# Patient Record
Sex: FEMALE | Race: WHITE | NOT HISPANIC OR LATINO | Employment: FULL TIME | URBAN - NONMETROPOLITAN AREA
[De-identification: names, ages, dates, MRNs, and addresses within clinical notes are randomized per-mention and may not be internally consistent; named-entity substitution may affect disease eponyms.]

---

## 2019-04-15 ENCOUNTER — PROCEDURE VISIT (OUTPATIENT)
Dept: OBSTETRICS AND GYNECOLOGY | Facility: CLINIC | Age: 24
End: 2019-04-15

## 2019-04-15 VITALS
DIASTOLIC BLOOD PRESSURE: 60 MMHG | HEIGHT: 63 IN | WEIGHT: 137 LBS | BODY MASS INDEX: 24.27 KG/M2 | SYSTOLIC BLOOD PRESSURE: 118 MMHG

## 2019-04-15 DIAGNOSIS — Z01.419 ENCOUNTER FOR GYNECOLOGICAL EXAMINATION (GENERAL) (ROUTINE) WITHOUT ABNORMAL FINDINGS: Primary | ICD-10-CM

## 2019-04-15 PROCEDURE — 99385 PREV VISIT NEW AGE 18-39: CPT | Performed by: OBSTETRICS & GYNECOLOGY

## 2019-04-15 RX ORDER — LEVOTHYROXINE SODIUM 0.12 MG/1
TABLET ORAL
COMMUNITY
End: 2020-09-25 | Stop reason: SDUPTHER

## 2019-04-15 NOTE — PROGRESS NOTES
Subjective  Chief Complaint   Patient presents with   • Gynecologic Exam     Patient needs refill on Sprintec.      Patient is 23 y.o.  here as a new patient for her annual examination.  Patient is new to this area.  Patient sees Stephanie Valladares nurse practitioner at [facility] for her primary care.  Patient gives a history of having previous HPV.  Patient is uncertain how this was detected.  Patient reports she had no further evaluation.  Patient denies any colposcopy or biopsy.  The patient denies any visible lesions.  The patient reports her last Pap smear was 1 year ago.  The patient is currently on oral contraceptives.  The patient declines any STI testing.  The patient denies any vaginal itching, discharge, or odor.  The patient reports her menses are regular and light in nature.  The patient desires to continue her current oral contraceptive.    History  Past Medical History:   Diagnosis Date   • Abnormal Pap smear of cervix 2018   • Celiac disease    • Disease of thyroid gland    • HPV (human papilloma virus) infection    • Migraine    • Ovarian cyst    • PMS (premenstrual syndrome)      Current Outpatient Medications on File Prior to Visit   Medication Sig Dispense Refill   • levothyroxine (SYNTHROID, LEVOTHROID) 125 MCG tablet levothyroxine 125 mcg tablet   Take 1 tablet every day by oral route for 90 days.     • [DISCONTINUED] SPRINTEC 28 0.25-35 MG-MCG per tablet Take 1 tablet by mouth Daily.      No current facility-administered medications on file prior to visit.      Allergies   Allergen Reactions   • Cephalosporins Rash   • Penicillins Rash   • Sulfa Antibiotics Rash     Past Surgical History:   Procedure Laterality Date   • BUNIONECTOMY Bilateral      Family History   Problem Relation Age of Onset   • No Known Problems Father    • No Known Problems Mother    • No Known Problems Brother    • No Known Problems Sister    • No Known Problems Son    • No Known Problems Daughter   "  • No Known Problems Paternal Grandfather    • No Known Problems Paternal Grandmother    • No Known Problems Maternal Grandmother    • No Known Problems Maternal Grandfather    • No Known Problems Maternal Aunt    • No Known Problems Maternal Uncle    • No Known Problems Paternal Aunt    • No Known Problems Paternal Uncle      Social History     Socioeconomic History   • Marital status: Single     Spouse name: Not on file   • Number of children: Not on file   • Years of education: Not on file   • Highest education level: Not on file   Tobacco Use   • Smoking status: Never Smoker   • Smokeless tobacco: Never Used   Substance and Sexual Activity   • Alcohol use: Yes     Frequency: Never     Comment: 1-2   • Drug use: No   • Sexual activity: Yes     Partners: Male     Birth control/protection: Condom, OCP     Review of Systems  The following systems were reviewed and negative:  constitution, eyes, ENT, respiratory, cardiovascular, gastrointestinal, genitourinary, integument, breast, hematologic / lymphatic, musculoskeletal, neurological, behavioral/psych, endocrine and allergies / immunologic     Objective  Vitals:    04/15/19 0928   BP: 118/60   Weight: 62.1 kg (137 lb)   Height: 160 cm (63\")     Physical Exam:  General Appearance: alert, appears stated age and cooperative  Head: normocephalic, without obvious abnormality and atraumatic  Eyes: lids and lashes normal, conjunctivae and sclerae normal, no icterus, no pallor, corneas clear and PERRLA  Ears: ears appear intact with no abnormalities noted  Nose: nares normal, septum midline, mucosa normal and no drainage  Neck: suppple, trachea midline and no thyromegaly  Lungs: clear to auscultation, respirations regular, respirations even and respirations unlabored  Heart: regular rhythm and normal rate, normal S1, S2, no murmur, gallop, or rubs and no click  Breasts: Examined in supine position  Symmetric without masses or skin dimpling  Nipples normal without " inversion, lesions or discharge  There are no palpable axillary nodes  Abdomen: normal bowel sounds, no masses, no hepatomegaly, no splenomegaly, soft non-tender, no guarding and no rebound tenderness  Pelvic: Clinical staff was present for exam  External genitalia:  normal appearance of the external genitalia including Bartholin's and [place]'s glands.  :  urethral meatus normal;  Vaginal:  normal pink mucosa without prolapse or lesions.  Cervix:  normal appearance.  Uterus:  normal size, shape and consistency.  Adnexa:  normal bimanual exam of the adnexa.  Pap smear done and specimen sent using Thin-Prep technique  Extremities: moves extremities well, no edema, no cyanosis and no redness  Skin: no bleeding, bruising or rash and no lesions noted  Lymph Nodes: no palpable adenopathy  Neuro: CN II-X grossly intact; sensation intact  Psych: normal mood and affect, oriented to person, time and place, thought content organized and appropriate judgment  Lab Review   No data reviewed    Imaging   No data reviewed    Assessment/Plan  Problem List Items Addressed This Visit     None      Visit Diagnoses     Encounter for gynecological examination (general) (routine) without abnormal findings    -  Primary  Pap was done today.  If she does not receive the results of the Pap within 2 weeks  time, she was instructed to call to find out the results.  HPV testing also performed given patient's history and records not available.  Plan pending results.  Rx given for ocps as noted.  Instructions and precautions given.    Relevant Orders    Pap IG, HPV-hr        Follow up as discussed/scheduled  This note was electronically signed.  Ashly Adames M.D.

## 2019-04-24 DIAGNOSIS — Z01.419 ENCOUNTER FOR GYNECOLOGICAL EXAMINATION (GENERAL) (ROUTINE) WITHOUT ABNORMAL FINDINGS: ICD-10-CM

## 2020-06-16 ENCOUNTER — OFFICE VISIT (OUTPATIENT)
Dept: OBSTETRICS AND GYNECOLOGY | Facility: CLINIC | Age: 25
End: 2020-06-16

## 2020-06-16 VITALS
BODY MASS INDEX: 22.99 KG/M2 | HEIGHT: 65 IN | WEIGHT: 138 LBS | DIASTOLIC BLOOD PRESSURE: 64 MMHG | SYSTOLIC BLOOD PRESSURE: 112 MMHG

## 2020-06-16 DIAGNOSIS — Z30.8 ENCOUNTER FOR OTHER CONTRACEPTIVE MANAGEMENT: ICD-10-CM

## 2020-06-16 DIAGNOSIS — Z01.419 ENCOUNTER FOR GYNECOLOGICAL EXAMINATION (GENERAL) (ROUTINE) WITHOUT ABNORMAL FINDINGS: Primary | ICD-10-CM

## 2020-06-16 PROCEDURE — 99395 PREV VISIT EST AGE 18-39: CPT | Performed by: OBSTETRICS & GYNECOLOGY

## 2020-06-16 NOTE — PROGRESS NOTES
Subjective  Chief Complaint   Patient presents with   • Gynecologic Exam     Patient wants to discuss Kyleena IUD.      Patient is 24 y.o.  here for her annual examination.  Patient is currently on Sprintec.  Patient reports her menstrual cycles are regular.  Patient would like to consider Kyleena.  Patient will be moving same her fiancé is in the .  Patient is concerned regarding her oral contraceptives as she may be moving frequently.  Patient is not contemplating a pregnancy in the near future.  Patient has had previous abnormal Pap smear.  Her last Pap smear however was normal.  The patient's last Pap smear was in April of last year.    History  Past Medical History:   Diagnosis Date   • Abnormal Pap smear of cervix 2018   • Celiac disease    • Disease of thyroid gland    • HPV (human papilloma virus) infection    • Migraine    • Ovarian cyst    • PMS (premenstrual syndrome)      Current Outpatient Medications on File Prior to Visit   Medication Sig Dispense Refill   • albuterol sulfate  (90 Base) MCG/ACT inhaler Inhale 2 puffs Every 4 (Four) Hours As Needed for Wheezing. 1 inhaler 0   • fluticasone (FLONASE) 50 MCG/ACT nasal spray 1-2 sprays each nostril daily 1 bottle 0   • Fluticasone Furoate-Vilanterol (Breo Ellipta) 200-25 MCG/INH inhaler Inhale 1 puff Daily. 1 inhaler 0   • levothyroxine (SYNTHROID, LEVOTHROID) 125 MCG tablet levothyroxine 125 mcg tablet   Take 1 tablet every day by oral route for 90 days.     • loratadine (CLARITIN) 10 MG tablet Take 1 tablet by mouth Daily. 30 tablet 0   • methylPREDNISolone (MEDROL, STONEY,) 4 MG tablet Take as directed on package instructions. 21 tablet 0     No current facility-administered medications on file prior to visit.      Allergies   Allergen Reactions   • Cephalosporins Rash   • Penicillins Rash   • Sulfa Antibiotics Rash     Past Surgical History:   Procedure Laterality Date   • BUNIONECTOMY Bilateral      Family History   Problem  "Relation Age of Onset   • No Known Problems Father    • No Known Problems Mother    • No Known Problems Brother    • No Known Problems Sister    • No Known Problems Son    • No Known Problems Daughter    • No Known Problems Paternal Grandfather    • No Known Problems Paternal Grandmother    • No Known Problems Maternal Grandmother    • No Known Problems Maternal Grandfather    • No Known Problems Maternal Aunt    • No Known Problems Maternal Uncle    • No Known Problems Paternal Aunt    • No Known Problems Paternal Uncle      Social History     Socioeconomic History   • Marital status: Single     Spouse name: Not on file   • Number of children: Not on file   • Years of education: Not on file   • Highest education level: Not on file   Tobacco Use   • Smoking status: Never Smoker   • Smokeless tobacco: Never Used   Substance and Sexual Activity   • Alcohol use: Yes     Frequency: Never     Comment: 1-2   • Drug use: No   • Sexual activity: Yes     Partners: Male     Birth control/protection: Condom, OCP     Review of Systems  The following systems were reviewed and negative:  constitution, eyes, ENT, respiratory, cardiovascular, gastrointestinal, genitourinary, integument, breast, hematologic / lymphatic, musculoskeletal, neurological, behavioral/psych, endocrine and allergies / immunologic     Objective  Vitals:    06/16/20 1057   BP: 112/64   Weight: 62.6 kg (138 lb)   Height: 165.1 cm (65\")     Physical Exam:  General Appearance: alert, appears stated age and cooperative  Head: normocephalic, without obvious abnormality and atraumatic  Eyes: lids and lashes normal, conjunctivae and sclerae normal, no icterus, no pallor, corneas clear and PERRLA  Ears: ears appear intact with no abnormalities noted  Nose: nares normal, septum midline, mucosa normal and no drainage  Neck: suppple, trachea midline and no thyromegaly  Lungs: clear to auscultation, respirations regular, respirations even and respirations " unlabored  Heart: regular rhythm and normal rate, normal S1, S2, no murmur, gallop, or rubs and no click  Breasts: Examined in supine position  Symmetric without masses or skin dimpling  Nipples normal without inversion, lesions or discharge  There are no palpable axillary nodes  Abdomen: normal bowel sounds, no masses, no hepatomegaly, no splenomegaly, soft non-tender, no guarding and no rebound tenderness  Pelvic: Clinical staff was present for exam  External genitalia:  normal appearance of the external genitalia including Bartholin's and [place]'s glands.  :  urethral meatus normal;  Vaginal:  normal pink mucosa without prolapse or lesions.  Cervix:  normal appearance.  Uterus:  normal size, shape and consistency.  Adnexa:  normal bimanual exam of the adnexa.  Pap smear done and specimen sent using Thin-Prep technique  Extremities: moves extremities well, no edema, no cyanosis and no redness  Skin: no bleeding, bruising or rash and no lesions noted  Lymph Nodes: no palpable adenopathy  Neuro: CN II-X grossly intact; sensation intact  Psych: normal mood and affect, oriented to person, time and place, thought content organized and appropriate judgment  Lab Review   No data reviewed    Imaging   No data reviewed    Decision to Obtain Medical Records  No    Summary of Medical Records  No    Assessment/Plan  Problem List Items Addressed This Visit     None      Visit Diagnoses     Encounter for gynecological examination (general) (routine) without abnormal findings    -  Primary  Pap was done today.  If she does not receive the results of the Pap within 2 weeks  time, she was instructed to call to find out the results.  I explained to Adelaida that the recommendations for Pap smear interval in a low risk patient  has lengthened to 3 years time.  I encouraged her to be seen yearly for a full physical exam including breast and pelvic exam even during the off years when PAP's will not be performed.    Encounter for other  contraceptive management      Contraceptive counseling was provided.  The various options for contraception was discussed including natural family planning, withdrawal method, barrier methods, spermicides, oral contraception, transdermal patch, vaginal ring, injection, implant, and IUDs.  The risks, complications, failure rates, and benefits of each were discussed.  Discussed specifically with the patient Kyleena.  I discussed the risk, complications, benefits, as well as other alternatives to Kyleena.  Patient desires insertion with her next menstrual cycle.  Will obtain coverage as noted.  Patient is to continue Sprintec at present.  Instructions and precautions have been given.        Follow up as discussed/scheduled  Note: Speech recognition transcription software may have been used to dictate portions of this document.  An attempt at proofreading has been made though minor errors in transcription may still be present.  This note was electronically signed.  Ashly Adames M.D.

## 2020-06-24 DIAGNOSIS — Z01.419 ENCOUNTER FOR GYNECOLOGICAL EXAMINATION (GENERAL) (ROUTINE) WITHOUT ABNORMAL FINDINGS: ICD-10-CM

## 2020-07-09 ENCOUNTER — OFFICE VISIT (OUTPATIENT)
Dept: OBSTETRICS AND GYNECOLOGY | Facility: CLINIC | Age: 25
End: 2020-07-09

## 2020-07-09 VITALS
HEIGHT: 65 IN | DIASTOLIC BLOOD PRESSURE: 60 MMHG | SYSTOLIC BLOOD PRESSURE: 116 MMHG | WEIGHT: 140 LBS | BODY MASS INDEX: 23.32 KG/M2

## 2020-07-09 DIAGNOSIS — N83.202 LEFT OVARIAN CYST: ICD-10-CM

## 2020-07-09 DIAGNOSIS — Z32.02 PREGNANCY TEST NEGATIVE: ICD-10-CM

## 2020-07-09 DIAGNOSIS — Z30.430 ENCOUNTER FOR IUD INSERTION: Primary | ICD-10-CM

## 2020-07-09 LAB
B-HCG UR QL: NEGATIVE
INTERNAL NEGATIVE CONTROL: NEGATIVE
INTERNAL POSITIVE CONTROL: POSITIVE
Lab: NORMAL

## 2020-07-09 PROCEDURE — 81025 URINE PREGNANCY TEST: CPT | Performed by: OBSTETRICS & GYNECOLOGY

## 2020-07-09 PROCEDURE — 58300 INSERT INTRAUTERINE DEVICE: CPT | Performed by: OBSTETRICS & GYNECOLOGY

## 2020-07-09 PROCEDURE — 99212 OFFICE O/P EST SF 10 MIN: CPT | Performed by: OBSTETRICS & GYNECOLOGY

## 2020-07-09 NOTE — PROGRESS NOTES
IUD Insertion    Patient's last menstrual period was 2020.    Date of procedure:  2020    Risks and benefits discussed? yes  All questions answered? yes  Consents given by The patient  Written consent obtained? yes    Local anesthesia used:  no    Procedure documentation:    After verifying the patient had a low probability of being pregnant and met the criteria for insertion, a sterile speculum has placed and the cervix was cleansed with an antiseptic solution.  Vaginal discharge was scant.  The anterior lip of the cervix was grasped with a tenaculum and the uterine cavity was gently sounded. There was mild difficulty passing the sound through the cervix.  Cervical dilation did not need to be performed prior to placing the IUD.  The uterus was anteverted and sounded to 8 cms.  The Kyleena was then prepared per the manufacturers instructions.    The Kyleena was advanced to a point 2 cms from the fundus and then the arms were released from the sheath.  The device was advanced to the fundus and the device was released fully from the sheath.. The string was cut 5 cms in length.  Bleeding from the cervix was scant.    The patient continued to have moderate pain post insertion.  Patient also became vasovagal.  A TVS was therefore performed with the findings as noted.    US Non-ob Transvaginal  Adelaida Grey  : 1995  MRN: [id number]  Date: 2020    Reason for exam/History:  IUD placement    The ultrasound images are reviewed.  The uterus is anteverted in position.    The uterus appears normal.  The IUD is noted in place.  The endometrium   appears thin.  The bilateral ovaries are abnormal in appearance.  The   right ovary has small follicles.  The left ovary has a small follicle and   a 2.7 cm simple appearing cyst.  There is normal vascular flow noted.    There is no free fluid noted.    The exam limitations noted:  none    See ultrasound report for measurements and structures  identified.    Ashly Adames MD, [facility]  [facility]      Post procedure instructions: It was reviewed that the Kyleena will not alter the timing of when she bleeds but it may decrease the quantity of flow and cramps.  Roughly 30% of people will be amenorrheic over time.  Efficacy rate of 99.2% over 5 years was discussed.  Spontaneous expulsion rate of 1-2% was also discussed.  If she has any issue with fever or excessive bleeding or pain she is to call the office immediately.  Otherwise I would like to see her back in 5 weeks for f/u appt.  Pt informed regarding ultrasound findings.  Pt feeling better and able to ambulate.  Instructions and precautions given.  A note is given for tomorrow.  Patient is also instructed in the use of Motrin as well as a heating pad.  Patient is to follow-up as scheduled.    This note was electronically signed.  Ashly Adames M.D.       99

## 2020-08-13 ENCOUNTER — OFFICE VISIT (OUTPATIENT)
Dept: OBSTETRICS AND GYNECOLOGY | Facility: CLINIC | Age: 25
End: 2020-08-13

## 2020-08-13 VITALS
SYSTOLIC BLOOD PRESSURE: 116 MMHG | WEIGHT: 137 LBS | DIASTOLIC BLOOD PRESSURE: 60 MMHG | HEIGHT: 65 IN | BODY MASS INDEX: 22.82 KG/M2

## 2020-08-13 DIAGNOSIS — Z30.431 IUD CHECK UP: Primary | ICD-10-CM

## 2020-08-13 DIAGNOSIS — N93.0 POSTCOITAL BLEEDING: ICD-10-CM

## 2020-08-13 PROCEDURE — 99213 OFFICE O/P EST LOW 20 MIN: CPT | Performed by: OBSTETRICS & GYNECOLOGY

## 2020-08-13 NOTE — PROGRESS NOTES
Subjective  Chief Complaint   Patient presents with   • Follow-up     5 week follow up IUD check, patient advised partner can feel strings during intercourse.      Patient is 25 y.o.  here for follow-up of her recent insertion of IUD.  Patient reports she has had irregular bleeding since placement of Kyleena.  Patient reports it is not been heavy in nature.  Patient was due for her menstrual cycle last week.  She had spotting only.  Patient does report however having postcoital bleeding.  Patient reports this may occur during or after intercourse.  Patient reports she has had cramping post intercourse as well.  Patient reports this has been since her placement of the IUD.  Patient denies any pelvic pain otherwise.  Patient denies any vaginal discharge.  Patient reports her partner can feel the strings but reports no pain or discomfort.  Patient overall is happy with her IUD.  Patient desires to continue at present.    History  Past Medical History:   Diagnosis Date   • Abnormal Pap smear of cervix 2018   • Celiac disease    • Disease of thyroid gland    • Encounter for IUD insertion 2020    Kyleena   • HPV (human papilloma virus) infection    • Migraine    • Ovarian cyst    • PMS (premenstrual syndrome)      Current Outpatient Medications on File Prior to Visit   Medication Sig Dispense Refill   • albuterol sulfate  (90 Base) MCG/ACT inhaler Inhale 2 puffs Every 4 (Four) Hours As Needed for Wheezing. 1 inhaler 0   • fluticasone (FLONASE) 50 MCG/ACT nasal spray 1-2 sprays each nostril daily 1 bottle 0   • Fluticasone Furoate-Vilanterol (Breo Ellipta) 200-25 MCG/INH inhaler Inhale 1 puff Daily. 1 inhaler 0   • levonorgestrel (Kyleena) 19.5 MG intrauterine device IUD Kyleena 17.5 mcg/24 hrs (5yrs) 19.5mg intrauterine device     • levothyroxine (SYNTHROID, LEVOTHROID) 125 MCG tablet levothyroxine 125 mcg tablet   Take 1 tablet every day by oral route for 90 days.     • loratadine (CLARITIN) 10 MG  "tablet Take 1 tablet by mouth Daily. 30 tablet 0     No current facility-administered medications on file prior to visit.      Allergies   Allergen Reactions   • Azithromycin Rash   • Cephalosporins Rash   • Penicillins Rash   • Sulfa Antibiotics Rash     Past Surgical History:   Procedure Laterality Date   • BUNIONECTOMY Bilateral      Family History   Problem Relation Age of Onset   • No Known Problems Father    • No Known Problems Mother    • No Known Problems Brother    • No Known Problems Sister    • No Known Problems Son    • No Known Problems Daughter    • No Known Problems Paternal Grandfather    • No Known Problems Paternal Grandmother    • No Known Problems Maternal Grandmother    • No Known Problems Maternal Grandfather    • No Known Problems Maternal Aunt    • No Known Problems Maternal Uncle    • No Known Problems Paternal Aunt    • No Known Problems Paternal Uncle      Social History     Socioeconomic History   • Marital status: Single     Spouse name: Not on file   • Number of children: Not on file   • Years of education: Not on file   • Highest education level: Not on file   Tobacco Use   • Smoking status: Never Smoker   • Smokeless tobacco: Never Used   Substance and Sexual Activity   • Alcohol use: Yes     Frequency: Never     Comment: 1-2   • Drug use: No   • Sexual activity: Yes     Partners: Male     Birth control/protection: Condom, IUD       Review of Systems  The following systems were reviewed and negative:  constitution, eyes, ENT, respiratory, cardiovascular, gastrointestinal, genitourinary, integument, breast, hematologic / lymphatic, musculoskeletal, neurological, behavioral/psych, endocrine and allergies / immunologic  Objective  Vitals:    08/13/20 1419   BP: 116/60   Weight: 62.1 kg (137 lb)   Height: 165.1 cm (65\")     Physical Exam:  General Appearance: alert, appears stated age and cooperative  Head: normocephalic, without obvious abnormality and atraumatic  Eyes: lids and lashes " normal, conjunctivae and sclerae normal, no icterus, no pallor, corneas clear and PERRLA  Ears: ears appear intact with no abnormalities noted  Nose: nares normal, septum midline, mucosa normal and no drainage  Neck: suppple, trachea midline and no thyromegaly  Lungs: clear to auscultation, respirations regular, respirations even and respirations unlabored  Heart: regular rhythm and normal rate, normal S1, S2, no murmur, gallop, or rubs and no click  Breasts: Not performed.  Abdomen: normal bowel sounds, no masses, no hepatomegaly, no splenomegaly, soft non-tender, no guarding and no rebound tenderness  Pelvic: Clinical staff was present for exam  External genitalia:  normal appearance of the external genitalia including Bartholin's and [place]'s glands.  :  urethral meatus normal;  Vaginal:  normal pink mucosa without prolapse or lesions.  Cervix:  normal appearance. IUD string present - 3 cms in length;  Uterus:  normal size, shape and consistency.  Adnexa:  normal bimanual exam of the adnexa.  Extremities: moves extremities well, no edema, no cyanosis and no redness  Skin: no bleeding, bruising or rash and no lesions noted  Lymph Nodes: no palpable adenopathy  Neuro: CN II-X grossly intact; sensation intact  Psych: normal mood and affect, oriented to person, time and place, thought content organized and appropriate judgment  Lab Review   No data reviewed    Imaging   No data reviewed    Decision to Obtain Medical Records  No    Summary of Medical Records  No    Assessment/Plan  Problem List Items Addressed This Visit     None      Visit Diagnoses     IUD check up    -  Primary  Normal examination today.  Instructions precautions have been given.  Patient has been reassured regarding the findings.    Postcoital bleeding    New  Patient with postcoital bleeding as noted.  There are no abnormalities noted today on examination.  Instructions and precautions have been given.  Patient is to follow-up if continued  symptoms for further evaluation as patient would need cultures as well as TVS.               Follow up as discussed/scheduled  Note: Speech recognition transcription software may have been used to dictate portions of this document.  An attempt at proofreading has been made though minor errors in transcription may still be present.  This note was electronically signed.  Ashly Adames M.D.

## 2020-09-25 ENCOUNTER — TELEPHONE (OUTPATIENT)
Dept: OBSTETRICS AND GYNECOLOGY | Facility: CLINIC | Age: 25
End: 2020-09-25

## 2020-09-25 RX ORDER — LEVOTHYROXINE SODIUM 0.12 MG/1
125 TABLET ORAL DAILY
Qty: 30 TABLET | Refills: 2 | Status: SHIPPED | OUTPATIENT
Start: 2020-09-25 | End: 2021-07-19 | Stop reason: SDUPTHER

## 2020-09-25 NOTE — TELEPHONE ENCOUNTER
----- Message from Cathie Harrington sent at 9/25/2020  9:36 AM EDT -----  Regarding: PT  Contact: PT  THIS IS DR LANDIS'S PT.  SHE CALLED TO SEE IF WE CAN FILL HER LEVOTHYROXINE 125MCG DAILY.  HER PCP HAS BEEN FURLOUGHED, SO SHE IS UNABLE TO GET REFILLS.  SHE HAS MOVED TO [place].  CAN WE SEND TO [facility] IN [place], [place]?  (SAVED IN CHART)  THANKS

## 2020-09-25 NOTE — TELEPHONE ENCOUNTER
Okay to send in refills for levothyroxine until patient can see primary care provider.  Thank you.

## 2021-07-01 ENCOUNTER — OFFICE VISIT (OUTPATIENT)
Dept: OBSTETRICS AND GYNECOLOGY | Facility: CLINIC | Age: 26
End: 2021-07-01

## 2021-07-01 VITALS
SYSTOLIC BLOOD PRESSURE: 110 MMHG | HEIGHT: 65 IN | BODY MASS INDEX: 21.79 KG/M2 | DIASTOLIC BLOOD PRESSURE: 60 MMHG | WEIGHT: 130.8 LBS

## 2021-07-01 DIAGNOSIS — N90.89 VULVAR EDEMA: Primary | ICD-10-CM

## 2021-07-01 PROCEDURE — 99213 OFFICE O/P EST LOW 20 MIN: CPT | Performed by: PHYSICIAN ASSISTANT

## 2021-07-01 RX ORDER — CLINDAMYCIN HYDROCHLORIDE 150 MG/1
150 CAPSULE ORAL 2 TIMES DAILY
Qty: 14 CAPSULE | Refills: 0 | Status: SHIPPED | OUTPATIENT
Start: 2021-07-01 | End: 2021-07-08

## 2021-07-01 NOTE — PROGRESS NOTES
Subjective   Chief Complaint   Patient presents with   • Vaginal Pain     Patient is C/O vaginal bump that is painful       Adelaida Grey is a 26 y.o. year old  presenting to be seen for complaint of right vulvar swelling and pain.  She first noticed an uncomfortable vulvar lump middle to the end of May.  She states that it went down after a couple of days but then since then off-and-on she has still had some swelling and discomfort.  She has not seen any drainage from the area.  No vaginal discharge.  No urinary symptoms.    Past Medical History:   Diagnosis Date   • Abnormal Pap smear of cervix 2018   • Celiac disease    • Disease of thyroid gland    • Encounter for IUD insertion 2020    Kyleena   • HPV (human papilloma virus) infection    • Migraine    • Ovarian cyst    • PMS (premenstrual syndrome)         Current Outpatient Medications:   •  levonorgestrel (Kyleena) 19.5 MG intrauterine device IUD, Kyleena 17.5 mcg/24 hrs (5yrs) 19.5mg intrauterine device, Disp: , Rfl:   •  levothyroxine (SYNTHROID, LEVOTHROID) 125 MCG tablet, Take 1 tablet by mouth Daily for 30 days., Disp: 30 tablet, Rfl: 2  •  clindamycin (Cleocin) 150 MG capsule, Take 1 capsule by mouth 2 (two) times a day for 7 days., Disp: 14 capsule, Rfl: 0   Allergies   Allergen Reactions   • Azithromycin Rash   • Cephalosporins Rash   • Penicillins Rash   • Sulfa Antibiotics Rash      Past Surgical History:   Procedure Laterality Date   • BUNIONECTOMY Bilateral       Social History     Socioeconomic History   • Marital status: Single     Spouse name: Not on file   • Number of children: Not on file   • Years of education: Not on file   • Highest education level: Not on file   Tobacco Use   • Smoking status: Never Smoker   • Smokeless tobacco: Never Used   Vaping Use   • Vaping Use: Never used   Substance and Sexual Activity   • Alcohol use: Yes     Comment: 1-2   • Drug use: No   • Sexual activity: Yes     Partners: Male     Birth  "control/protection: Condom, I.U.D.      Family History   Problem Relation Age of Onset   • No Known Problems Father    • No Known Problems Mother    • No Known Problems Brother    • No Known Problems Sister    • No Known Problems Son    • No Known Problems Daughter    • No Known Problems Paternal Grandfather    • No Known Problems Paternal Grandmother    • No Known Problems Maternal Grandmother    • No Known Problems Maternal Grandfather    • No Known Problems Maternal Aunt    • No Known Problems Maternal Uncle    • No Known Problems Paternal Aunt    • No Known Problems Paternal Uncle        Review of Systems   Constitutional: Negative for chills, diaphoresis and fever.   Gastrointestinal: Negative.    Genitourinary: Positive for vaginal pain. Negative for difficulty urinating, dysuria, menstrual problem, pelvic pain, vaginal bleeding and vaginal discharge.           Objective   /60   Ht 165.1 cm (65\")   Wt 59.3 kg (130 lb 12.8 oz)   Breastfeeding No   BMI 21.77 kg/m²     Physical Exam  Constitutional:       Appearance: Normal appearance. She is well-developed and well-groomed.   Eyes:      General: Lids are normal.      Extraocular Movements: Extraocular movements intact.      Conjunctiva/sclera: Conjunctivae normal.   Genitourinary:     Labia:         Right: Tenderness present. No rash or lesion.         Left: No rash, tenderness or lesion.       Urethra: No prolapse, urethral pain, urethral swelling or urethral lesion.          Comments: Right superior one half of the labia majora is mildly edematous.  There is no redness.  There is mild tenderness.  There are no firm lumps or bumps palpable but in the area of edema there is a soft cystic consistency.  Skin:     General: Skin is warm and dry.      Findings: No bruising or lesion.   Neurological:      Mental Status: She is alert.   Psychiatric:         Attention and Perception: Attention normal.         Mood and Affect: Mood normal.         Speech: " Speech normal.         Behavior: Behavior is cooperative.            Result Review :                   Assessment and Plan  Diagnoses and all orders for this visit:    1. Vulvar edema (Primary)    Other orders  -     clindamycin (Cleocin) 150 MG capsule; Take 1 capsule by mouth 2 (two) times a day for 7 days.  Dispense: 14 capsule; Refill: 0      Patient Instructions   No overt signs of infection or inflammation.  However due to the persistent nature of the complaint we will try a round of antibiotic.  Patient is encouraged to apply cool compresses 3-4 times daily.  Encouraged to wear loose fitting clothing.  Encouraged to abstain from intercourse for 10 days.  Follow-up 7 to 10 days if needed             This note was electronically signed.    Mary Shelton PA-C   July 2, 2021

## 2021-07-02 NOTE — PATIENT INSTRUCTIONS
No overt signs of infection or inflammation.  However due to the persistent nature of the complaint we will try a round of antibiotic.  Patient is encouraged to apply cool compresses 3-4 times daily.  Encouraged to wear loose fitting clothing.  Encouraged to abstain from intercourse for 10 days.  Follow-up 7 to 10 days if needed

## 2021-07-20 RX ORDER — LEVOTHYROXINE SODIUM 0.12 MG/1
125 TABLET ORAL DAILY
Qty: 30 TABLET | Refills: 2 | Status: SHIPPED | OUTPATIENT
Start: 2021-07-20 | End: 2021-09-03

## 2021-07-22 ENCOUNTER — TELEPHONE (OUTPATIENT)
Dept: OBSTETRICS AND GYNECOLOGY | Facility: CLINIC | Age: 26
End: 2021-07-22

## 2021-07-22 NOTE — TELEPHONE ENCOUNTER
These are not typical side effects from Clindamycin so I don't think related. But Clindamycin was prescribed 3 weeks ago for only one week. She is just now finishing up medication?

## 2021-07-22 NOTE — TELEPHONE ENCOUNTER
----- Message from Adelaida Grey sent at 7/22/2021  7:10 AM EDT -----  Regarding: Visit Follow-Up Question  Contact: [phone]  I'm on the second to last full dose of the Clindamyacin that was prescribed, and this morning I have a burning sensation in my right inner ear, down the right side of my throat, and down into the right side of my chest. Is this something to be concerned about, or connected to the medication I'm taking? I thought it also could possibly be from allergies, as I just recovered from a sinus infection last week. Thank you.

## 2021-08-11 ENCOUNTER — OFFICE VISIT (OUTPATIENT)
Dept: OBSTETRICS AND GYNECOLOGY | Facility: CLINIC | Age: 26
End: 2021-08-11

## 2021-08-11 VITALS
SYSTOLIC BLOOD PRESSURE: 112 MMHG | DIASTOLIC BLOOD PRESSURE: 62 MMHG | WEIGHT: 132 LBS | BODY MASS INDEX: 23.39 KG/M2 | HEIGHT: 63 IN

## 2021-08-11 DIAGNOSIS — Z11.3 SCREENING EXAMINATION FOR STD (SEXUALLY TRANSMITTED DISEASE): ICD-10-CM

## 2021-08-11 DIAGNOSIS — Z01.419 ENCOUNTER FOR GYNECOLOGICAL EXAMINATION (GENERAL) (ROUTINE) WITHOUT ABNORMAL FINDINGS: Primary | ICD-10-CM

## 2021-08-11 PROCEDURE — 99395 PREV VISIT EST AGE 18-39: CPT | Performed by: OBSTETRICS & GYNECOLOGY

## 2021-08-12 NOTE — PROGRESS NOTES
"Chief Complaint  Gynecologic Exam     History of Present Illness:  Patient is 26 y.o.  who presents to [facility] for her annual examination.  Patient currently has Kyleena IUD.  Patient has scant irregular bleeding.  Patient is content with her IUD.  Patient desires to continue at present.  Patient denies any changes in her health or medications.    Physical Examination:  Vital Signs: /62   Ht 160 cm (63\")   Wt 59.9 kg (132 lb)   BMI 23.38 kg/m²     General Appearance: alert, appears stated age, and cooperative  Breasts: Examined in supine position  Symmetric without masses or skin dimpling  Nipples normal without inversion, lesions or discharge  There are no palpable axillary nodes  Abdomen: no masses, no hepatomegaly, no splenomegaly, soft non-tender, no guarding and no rebound tenderness  Pelvic: Clinical staff was present for exam  External genitalia:  normal appearance of the external genitalia including Bartholin's and Alzada's glands.  :  urethral meatus normal;  Vaginal:  normal pink mucosa without prolapse or lesions.  Cervix:  normal appearance. IUD string present - 3 cms in length;  Uterus:  normal size, shape and consistency.  Adnexa:  normal bimanual exam of the adnexa.  Pap smear done and specimen sent using Thin-Prep technique  Cultures obtained    Data Review:  The following data was reviewed by: Ashly Adames MD on 2021:     Labs:    Imaging:    Medical Records:  None    Assessment and Plan   Problem List Items Addressed This Visit     None      Visit Diagnoses     Encounter for gynecological examination (general) (routine) without abnormal findings    -  Primary  Pap was done today.  If she does not receive the results of the Pap within 2 weeks  time, she was instructed to call to find out the results.  I explained to Adelaida that the recommendations for Pap smear interval in a low risk patient  has lengthened to 3 years time.  I encouraged her to be " seen yearly for a full physical exam including breast and pelvic exam even during the off years when PAP's will not be performed.    Relevant Orders    Pap IG, Ct-Ng, Rfx HPV ASCU    Screening examination for STD (sexually transmitted disease)      Cultures obtained as noted.    Relevant Orders    Pap IG, Ct-Ng, Rfx HPV ASCU          Follow Up/Instructions:  Follow up as noted.  Patient was given instructions and counseling regarding her condition or for health maintenance advice. Please see specific information pulled into the AVS if appropriate.     Note: Speech recognition transcription software may have been used to dictate portions of this document.  An attempt at proofreading has been made though minor errors in transcription may still be present.    This note was electronically signed.  Ashly Adames M.D.

## 2021-08-19 ENCOUNTER — TELEPHONE (OUTPATIENT)
Dept: OBSTETRICS AND GYNECOLOGY | Facility: CLINIC | Age: 26
End: 2021-08-19

## 2021-08-19 NOTE — TELEPHONE ENCOUNTER
----- Message from Nicci Atwood sent at 8/19/2021  1:27 PM EDT -----  Pt is requesting for Dr Adames to give her a referral to Endocrinology with [facility].     She said her primary dr is wanting to change her medications. She said Dr Adames is aware why she needs to see an endocrinologist.

## 2021-08-20 DIAGNOSIS — E03.9 HYPOTHYROIDISM (ACQUIRED): Primary | ICD-10-CM

## 2021-08-25 DIAGNOSIS — Z11.3 SCREENING EXAMINATION FOR STD (SEXUALLY TRANSMITTED DISEASE): ICD-10-CM

## 2021-08-25 DIAGNOSIS — Z01.419 ENCOUNTER FOR GYNECOLOGICAL EXAMINATION (GENERAL) (ROUTINE) WITHOUT ABNORMAL FINDINGS: ICD-10-CM

## 2021-09-02 ENCOUNTER — TELEPHONE (OUTPATIENT)
Dept: INTERNAL MEDICINE | Facility: CLINIC | Age: 26
End: 2021-09-02

## 2021-09-02 ENCOUNTER — OFFICE VISIT (OUTPATIENT)
Dept: INTERNAL MEDICINE | Facility: CLINIC | Age: 26
End: 2021-09-02

## 2021-09-02 VITALS
WEIGHT: 137.8 LBS | OXYGEN SATURATION: 99 % | HEART RATE: 100 BPM | RESPIRATION RATE: 16 BRPM | BODY MASS INDEX: 24.41 KG/M2 | HEIGHT: 63 IN | DIASTOLIC BLOOD PRESSURE: 68 MMHG | TEMPERATURE: 97.2 F | SYSTOLIC BLOOD PRESSURE: 114 MMHG

## 2021-09-02 DIAGNOSIS — R30.0 DYSURIA: Primary | ICD-10-CM

## 2021-09-02 DIAGNOSIS — E03.8 HYPOTHYROIDISM DUE TO HASHIMOTO'S THYROIDITIS: ICD-10-CM

## 2021-09-02 DIAGNOSIS — E06.3 HYPOTHYROIDISM DUE TO HASHIMOTO'S THYROIDITIS: ICD-10-CM

## 2021-09-02 DIAGNOSIS — Z76.89 ENCOUNTER TO ESTABLISH CARE: ICD-10-CM

## 2021-09-02 DIAGNOSIS — N75.8 BARTHOLIN'S GLAND INFECTION: ICD-10-CM

## 2021-09-02 LAB
BILIRUB BLD-MCNC: NEGATIVE MG/DL
CLARITY, POC: CLEAR
COLOR UR: YELLOW
GLUCOSE UR STRIP-MCNC: NEGATIVE MG/DL
KETONES UR QL: NEGATIVE
LEUKOCYTE EST, POC: NEGATIVE
NITRITE UR-MCNC: NEGATIVE MG/ML
PH UR: 6.5 [PH] (ref 5–8)
PROT UR STRIP-MCNC: NEGATIVE MG/DL
RBC # UR STRIP: NEGATIVE /UL
SP GR UR: 1.01 (ref 1–1.03)
UROBILINOGEN UR QL: NORMAL

## 2021-09-02 PROCEDURE — 99214 OFFICE O/P EST MOD 30 MIN: CPT | Performed by: NURSE PRACTITIONER

## 2021-09-02 PROCEDURE — 81003 URINALYSIS AUTO W/O SCOPE: CPT | Performed by: NURSE PRACTITIONER

## 2021-09-02 RX ORDER — DOXYCYCLINE HYCLATE 100 MG/1
100 CAPSULE ORAL 2 TIMES DAILY
Qty: 14 CAPSULE | Refills: 0 | Status: SHIPPED | OUTPATIENT
Start: 2021-09-02 | End: 2021-09-09

## 2021-09-02 NOTE — PROGRESS NOTES
Office Visit      Patient Name: Adelaida Grey  : 1995   MRN: [id number]   Care Team: Patient Care Team:  Na Becker APRN as PCP - General (Family Medicine)    Chief Complaint  Establish Care and Hypothyroidism    Subjective     Subjective      Adelaida Grey presents to [facility] to establish care and UTI concerns.  Transferring care from [facility] practice [facility]. Pertinent medical history includes hashimotos- has been on synthroid since 6 years of age, frequent UTIs, celiac disease, and migraines.   Does not follow GI for her celiac disease. Follows gluten free diet.   Recent TSH 0.09, primary care told her to stop synthroid completely. She did not do this due to fear of messing up her thyroid even more as she has been on this for many years. Denies palpitations, increase in headaches, increase in anxiety, diarrhea, fever, abnormal weight loss, and increased appetite. Was taking hair, skin, and nail vitamins at the time of the lab draw that she has since stopped.   Acutely complains today of urinary urgency, urinary frequency, unilateral labial swelling, and dysuria. Symptoms started 1.5 weeks ago.   Denies hematuria, flank pain, constipation, fever, vaginal discharge, vaginal itching, and new sex partners.   Has not tried anything for the symptoms. Was recently treated for UTI on  with macrobid, symptoms did improve with this.      Review of Systems   Constitutional: Negative for appetite change, fatigue and fever.   HENT: Negative for congestion, sore throat and trouble swallowing.    Eyes: Negative for blurred vision and visual disturbance.   Respiratory: Negative for cough, shortness of breath and wheezing.    Cardiovascular: Negative for chest pain, palpitations and leg swelling.   Gastrointestinal: Negative for abdominal pain, blood in stool, constipation, diarrhea and nausea.   Endocrine: Negative for polydipsia, polyphagia and polyuria.  "  Genitourinary: Positive for dysuria, frequency, urgency and vaginal pain.   Musculoskeletal: Negative for arthralgias, back pain and myalgias.   Skin: Negative for rash.   Neurological: Negative for dizziness, weakness, light-headedness and headache.   Hematological: Positive for adenopathy.   Psychiatric/Behavioral: Negative for sleep disturbance and depressed mood. The patient is not nervous/anxious.        Objective     Objective   Vital Signs:   /68   Pulse 100   Temp 97.2 °F (36.2 °C) (Temporal)   Resp 16   Ht 160 cm (63\")   Wt 62.5 kg (137 lb 12.8 oz)   SpO2 99%   BMI 24.41 kg/m²     Physical Exam  Vitals and nursing note reviewed. Exam conducted with a chaperone present (Stephanie Jason [facility]).   Constitutional:       General: She is not in acute distress.     Appearance: Normal appearance. She is not toxic-appearing.   Eyes:      Pupils: Pupils are equal, round, and reactive to light.   Neck:      Vascular: No carotid bruit.   Cardiovascular:      Rate and Rhythm: Normal rate and regular rhythm.      Heart sounds: Normal heart sounds. No murmur heard.     Pulmonary:      Effort: Pulmonary effort is normal. No respiratory distress.      Breath sounds: Normal breath sounds. No wheezing.   Abdominal:      General: Bowel sounds are normal. There is no distension.      Palpations: Abdomen is soft.      Tenderness: There is no abdominal tenderness.   Genitourinary:     Labia:         Right: Tenderness (Diffuse swelling of right labia majora with mild erythema.  No nodule or lesion appreciated) present.    Musculoskeletal:         General: Normal range of motion.      Cervical back: Neck supple. No tenderness.   Skin:     General: Skin is warm and dry.      Findings: No rash.   Neurological:      General: No focal deficit present.      Mental Status: She is alert.   Psychiatric:         Mood and Affect: Mood normal.         Behavior: Behavior normal.          Assessment / Plan      Assessment/Plan "   Problem List Items Addressed This Visit     None      Visit Diagnoses     Dysuria    -  Primary    Relevant Orders    POCT urinalysis dipstick, automated (Completed)    TSH Rfx On Abnormal To Free T4    Hepatitis C antibody    Urine Culture - Urine, Urine, Clean Catch  Brief Urine Lab Results  (Last result in the past 365 days)      Color   Clarity   Blood   Leuk Est   Nitrite   Protein   CREAT   Urine HCG        09/02/21 0856 Yellow Clear Negative Negative Negative Negative             We will send culture due to symptoms.  Stay hydrated with clear decaffeinated fluids and avoid constipation.    Hypothyroidism due to Hashimoto's thyroiditis        Relevant Orders    TSH Rfx On Abnormal To Free T4    Hepatitis C antibody    Recheck TSH today as she was previously on biotin.  Has been off for at least a week.  Discussed proper dosing of Synthroid, take in the a.m. on empty stomach away from other medicines and food.  Long discussion on dangers of hyperthyroidism and symptoms to watch for.  Will adjust levothyroxine as needed.  Has appointment with endocrine in November, instructed to keep appointment.    Encounter to establish care        Relevant Orders    TSH Rfx On Abnormal To Free T4    Hepatitis C antibody    Brought recent lab work and has been reviewed, normal besides decreased TSH.    Bartholin's gland infection        Does most consistent with Bartholin's gland infection.  Treat with doxycycline twice daily x7 days.  Instructed to take with food and avoid direct sun exposure.  Warm Epsom salt soaks and Tylenol/ibuprofen as needed for pain.  Follow-up with OB/GYN as this is a reoccurring problem.           Follow Up   Return in about 4 weeks (around 9/30/2021), or if symptoms worsen or fail to improve, for Annual.  Patient was given instructions and counseling regarding her condition or for health maintenance advice. Please see specific information pulled into the AVS if appropriate.     Na Becker,  KVNG  [facility]

## 2021-09-02 NOTE — TELEPHONE ENCOUNTER
Hub staff attempted to follow warm transfer process and was unsuccessful     Caller: Adelaida Grey    Relationship to patient: Self    Best call back number: [phone]    Patient is needing: PATIENT WENT TO THE PHARMACY AND THEY DID NOT HAVE HER MEDICATION. SHE WOULD LIKE TO KNOW IF THIS MEDICATION HAS BEEN CALLED IN AND WHERE DID IT GET SENT TO. PATIENT IS WANTING IT TO GO TO THE [facility] IN [place].

## 2021-09-03 DIAGNOSIS — E06.3 HYPOTHYROIDISM DUE TO HASHIMOTO'S THYROIDITIS: Primary | ICD-10-CM

## 2021-09-03 DIAGNOSIS — E03.8 HYPOTHYROIDISM DUE TO HASHIMOTO'S THYROIDITIS: Primary | ICD-10-CM

## 2021-09-03 LAB
HCV AB S/CO SERPL IA: <0.1 S/CO RATIO (ref 0–0.9)
T4 FREE SERPL-MCNC: 1.53 NG/DL (ref 0.93–1.7)
TSH SERPL DL<=0.005 MIU/L-ACNC: 0.06 UIU/ML (ref 0.27–4.2)

## 2021-09-03 RX ORDER — LEVOTHYROXINE SODIUM 0.1 MG/1
100 TABLET ORAL DAILY
Qty: 30 TABLET | Refills: 1 | Status: SHIPPED | OUTPATIENT
Start: 2021-09-03 | End: 2021-11-30 | Stop reason: SDUPTHER

## 2021-09-04 LAB
BACTERIA UR CULT: NORMAL
BACTERIA UR CULT: NORMAL

## 2021-10-14 ENCOUNTER — OFFICE VISIT (OUTPATIENT)
Dept: INTERNAL MEDICINE | Facility: CLINIC | Age: 26
End: 2021-10-14

## 2021-10-14 VITALS
DIASTOLIC BLOOD PRESSURE: 72 MMHG | BODY MASS INDEX: 24.77 KG/M2 | SYSTOLIC BLOOD PRESSURE: 110 MMHG | OXYGEN SATURATION: 98 % | HEIGHT: 63 IN | TEMPERATURE: 98.2 F | RESPIRATION RATE: 16 BRPM | HEART RATE: 89 BPM | WEIGHT: 139.8 LBS

## 2021-10-14 DIAGNOSIS — E06.3 HYPOTHYROIDISM DUE TO HASHIMOTO'S THYROIDITIS: Primary | ICD-10-CM

## 2021-10-14 DIAGNOSIS — H65.03 NON-RECURRENT ACUTE SEROUS OTITIS MEDIA OF BOTH EARS: ICD-10-CM

## 2021-10-14 DIAGNOSIS — E03.8 HYPOTHYROIDISM DUE TO HASHIMOTO'S THYROIDITIS: Primary | ICD-10-CM

## 2021-10-14 PROBLEM — E03.9 HYPOTHYROIDISM: Status: ACTIVE | Noted: 2020-01-13

## 2021-10-14 PROCEDURE — 99213 OFFICE O/P EST LOW 20 MIN: CPT | Performed by: NURSE PRACTITIONER

## 2021-10-14 NOTE — PROGRESS NOTES
Answers for HPI/ROS submitted by the patient on 10/7/2021  Please describe your symptoms.: Follow up from last appt; still having periodic feelings of urinary urgency, have not been able to follow up with gynecologist yet., Currently have a cough, sore throat, and nasal congestion. Received second Covid-19 vaccine on 10/01 and have had symptoms since. , Still taking new medication as prescribed at last visit.  Have you had these symptoms before?: Yes  How long have you been having these symptoms?: 5-7 days  Please list any medications you are currently taking for this condition.: Levothyroxine 100mg- hypothyroidism, Ibuprofen 600mg - symptom relief from Covid-19 vaccine  Please describe any probable cause for these symptoms. : Received second Covid-19 vaccine on 10/1 and have had above symptoms since.  What is the primary reason for your visit?: Other      Office Visit      Patient Name: Adelaida Grey  : 1995   MRN: [id number]   Care Team: Patient Care Team:  Na Becker APRN as PCP - General (Family Medicine)    Chief Complaint  Hypothyroidism (6 week follow up )    Subjective     Subjective      Adelaida Grey presents to [facility] for hypothyroidism follow-up and allergy symptoms.   Lab Results   Component Value Date    TSH 0.062 (L) 2021     Levothyroxine decreased to 100mcg last visit, she is taking her medication as prescribed- first thing in the AM on an empty stomach away from food and medications. Denies brittle hair/nails, constipation, palpitations, excess sweating, heat/cold intolerance, and side effects from the medication. Has not gotten TSH rechecked yet.     Acutely complaining of allergy symptoms. Got 2nd COVID vaccine about 2 weeks ago and symptoms started  Shortly after. Symptoms are improving. Still endorsing postnasal drip, congestion, cough, sore throat first thing in the morning, and headache.   Denies fever, chills, body aches, loss  "of taste/smell, and shortness of breath.     Review of Systems   Constitutional: Negative for appetite change, fatigue and fever.   HENT: Positive for congestion, postnasal drip, sneezing and sore throat. Negative for ear pain and trouble swallowing.    Eyes: Negative for blurred vision and visual disturbance.   Respiratory: Positive for cough. Negative for shortness of breath and wheezing.    Cardiovascular: Negative for chest pain, palpitations and leg swelling.   Gastrointestinal: Negative for abdominal pain, blood in stool, constipation, diarrhea and nausea.   Endocrine: Negative for polydipsia, polyphagia and polyuria.   Genitourinary: Negative for dysuria.   Musculoskeletal: Negative for arthralgias, back pain and myalgias.   Skin: Negative for rash.   Neurological: Negative for dizziness, weakness, light-headedness and headache.   Psychiatric/Behavioral: Negative for sleep disturbance and depressed mood. The patient is not nervous/anxious.        Objective     Objective   Vital Signs:   /72   Pulse 89   Temp 98.2 °F (36.8 °C) (Temporal)   Resp 16   Ht 160 cm (63\")   Wt 63.4 kg (139 lb 12.8 oz)   SpO2 98%   BMI 24.76 kg/m²     Physical Exam  Vitals and nursing note reviewed.   Constitutional:       General: She is not in acute distress.     Appearance: Normal appearance. She is not toxic-appearing.   HENT:      Right Ear: A middle ear effusion (dull) is present. Tympanic membrane is not erythematous or bulging.      Left Ear: A middle ear effusion (dull) is present. Tympanic membrane is not erythematous or bulging.      Nose: Nose normal.      Mouth/Throat:      Mouth: Mucous membranes are moist.      Pharynx: Posterior oropharyngeal erythema (mild) present.   Eyes:      Pupils: Pupils are equal, round, and reactive to light.   Neck:      Vascular: No carotid bruit.   Cardiovascular:      Rate and Rhythm: Normal rate and regular rhythm.      Heart sounds: Normal heart sounds. No murmur " heard.      Pulmonary:      Effort: Pulmonary effort is normal. No respiratory distress.      Breath sounds: Normal breath sounds. No wheezing.   Abdominal:      General: Bowel sounds are normal. There is no distension.      Palpations: Abdomen is soft.      Tenderness: There is no abdominal tenderness.   Musculoskeletal:      Cervical back: Neck supple. No tenderness.   Skin:     General: Skin is warm and dry.      Findings: No rash.   Neurological:      General: No focal deficit present.      Mental Status: She is alert.   Psychiatric:         Mood and Affect: Mood normal.         Behavior: Behavior normal.          Assessment / Plan      Assessment/Plan   Problem List Items Addressed This Visit        Endocrine and Metabolic    Hypothyroidism - Primary  Update TSH today, will titrate levothyroxine as needed based upon results.  Continue current dosage for now.  Recommend medication compliance and reiterated proper administration.  She is scheduled to see endocrine in 1 month.      Other Visit Diagnoses     Non-recurrent acute serous otitis media of both ears        Recommend Flonase, Tylenol as needed, moist heat as needed, and diver maneuvers.  Discussed this can take up to 3 months to fully resolve.  If symptoms continue consider ENT referral.           Follow Up   Return for Annual.  Patient was given instructions and counseling regarding her condition or for health maintenance advice. Please see specific information pulled into the AVS if appropriate.     KVNG Arriola  [facility]

## 2021-11-30 ENCOUNTER — OFFICE VISIT (OUTPATIENT)
Dept: ENDOCRINOLOGY | Facility: CLINIC | Age: 26
End: 2021-11-30

## 2021-11-30 VITALS
OXYGEN SATURATION: 97 % | HEART RATE: 95 BPM | DIASTOLIC BLOOD PRESSURE: 64 MMHG | WEIGHT: 144 LBS | BODY MASS INDEX: 25.52 KG/M2 | HEIGHT: 63 IN | SYSTOLIC BLOOD PRESSURE: 110 MMHG

## 2021-11-30 DIAGNOSIS — K90.0 CELIAC DISEASE: ICD-10-CM

## 2021-11-30 DIAGNOSIS — E06.3 HYPOTHYROIDISM DUE TO HASHIMOTO'S THYROIDITIS: Primary | ICD-10-CM

## 2021-11-30 DIAGNOSIS — E03.8 HYPOTHYROIDISM DUE TO HASHIMOTO'S THYROIDITIS: Primary | ICD-10-CM

## 2021-11-30 PROCEDURE — 99244 OFF/OP CNSLTJ NEW/EST MOD 40: CPT | Performed by: INTERNAL MEDICINE

## 2021-11-30 RX ORDER — LEVOTHYROXINE SODIUM 112 UG/1
112 TABLET ORAL DAILY
Qty: 30 TABLET | Refills: 5 | Status: SHIPPED | OUTPATIENT
Start: 2021-11-30 | End: 2022-02-28 | Stop reason: SDUPTHER

## 2021-11-30 NOTE — PROGRESS NOTES
"Chief Complaint   Patient presents with   • Hypothyroidism        Referring Provider  Ashly Adames MD     HPI   Adelaida Grey is a 26 y.o. female had concerns including Hypothyroidism.     Pt was diagnosed with hypothyroidism at age 6. Is currently on levothyroxine 100 mcg daily. Dose was reduced from 125 mcg in September for suppressed TSH.   Since the dose change she feels \"ok\". Has had some weight gain which she feels like is due primarily to dietary changes.     Earlier in the year she went through a bad breakup - lost about 12-15 lbs during that time.     Recent TSH was in a mid normal range.     Pt has celiac disease. Has been on generic levothyroxine and tolerating without difficulty. Prefers not having high copay for brand medication.      Past Medical History:   Diagnosis Date   • Abnormal Pap smear of cervix 04/2018   • Celiac disease    • Encounter for IUD insertion 07/09/2020    Kyleena   • HPV (human papilloma virus) infection    • Hypothyroidism 2001    Hashimotos   • Migraine    • Ovarian cyst    • PMS (premenstrual syndrome)    • Urinary tract infection 8/2021    Currently on medication to treat   • Varicella 05/1996     Past Surgical History:   Procedure Laterality Date   • APPENDECTOMY  2010   • BUNIONECTOMY Bilateral    • WISDOM TOOTH EXTRACTION  2011      Family History   Problem Relation Age of Onset   • Thyroid disease Father         Hyperthyroidism   • Thyroid disease Mother         Hypothyroidism   • Celiac disease Mother    • No Known Problems Brother    • Thyroid disease Sister         Hypothyroidism   • Celiac disease Sister    • No Known Problems Son    • No Known Problems Daughter    • No Known Problems Paternal Grandfather    • No Known Problems Paternal Grandmother    • No Known Problems Maternal Grandmother    • No Known Problems Maternal Grandfather    • No Known Problems Maternal Aunt    • No Known Problems Maternal Uncle    • No Known Problems Paternal Aunt    • No Known " "Problems Paternal Uncle       Social History     Socioeconomic History   • Marital status: Single   Tobacco Use   • Smoking status: Never Smoker   • Smokeless tobacco: Never Used   Vaping Use   • Vaping Use: Never used   Substance and Sexual Activity   • Alcohol use: Yes     Alcohol/week: 2.0 standard drinks     Types: 2 Glasses of wine per week     Comment: Occasional, it varies.   • Drug use: No   • Sexual activity: Yes     Partners: Male     Birth control/protection: I.U.D.     Comment: Kyleena I.U.D, occasional condom use      Allergies   Allergen Reactions   • Azithromycin Rash   • Cephalosporins Rash   • Glutethimides Rash   • Penicillins Rash   • Sulfa Antibiotics Rash      Current Outpatient Medications on File Prior to Visit   Medication Sig Dispense Refill   • cholecalciferol (Cholecalciferol) 25 MCG (1000 UT) tablet Vitamin D3 25 mcg (1,000 unit) tablet   Take 1 tablet every day by oral route.     • ibuprofen (ADVIL,MOTRIN) 200 MG tablet Take 200 mg by mouth Every 6 (Six) Hours As Needed for Mild Pain .     • levonorgestrel (Kyleena) 19.5 MG intrauterine device IUD Kyleena 17.5 mcg/24 hrs (5yrs) 19.5mg intrauterine device     • [DISCONTINUED] levothyroxine (Synthroid) 100 MCG tablet Take 1 tablet by mouth Daily. 30 tablet 1     No current facility-administered medications on file prior to visit.        Review of Systems   Constitutional: Positive for unexpected weight gain.   HENT: Negative.    Eyes: Negative.    Respiratory: Negative.    Cardiovascular: Negative.    Gastrointestinal: Negative.    Endocrine: Negative.    Genitourinary: Negative.    Musculoskeletal: Negative.    Skin: Negative.    Allergic/Immunologic: Negative.    Neurological: Negative.    Hematological: Negative.    Psychiatric/Behavioral: Negative.         /64   Pulse 95   Ht 160 cm (63\")   Wt 65.3 kg (144 lb)   SpO2 97%   BMI 25.51 kg/m²      Physical Exam    Constitutional:  well developed; well nourished  no acute " distress   ENT/Thyroid: no thyromegaly  no palpable nodules   Eyes: EOM intact  Conjunctiva: clear   Respiratory:  breathing is unlabored  clear to auscultation bilaterally   Cardiovascular:  regular rate and rhythm, S1, S2 normal, no murmur, click, rub or gallop   Chest:  Not performed.   Abdomen: Not performed.   : Not performed.   Musculoskeletal: negative findings:  ROM of all joints is normal, no deformities present   Skin: dry and warm   Neuro: normal without focal findings and mental status, speech normal, alert and oriented x3   Psych: oriented to time, place and person, mood and affect are within normal limits     Labs/Imaging    TSH  Lab Results   Component Value Date    TSH 2.870 10/14/2021    TSH 0.062 (L) 09/02/2021       T4  Lab Results   Component Value Date    FREET4 1.53 09/02/2021       Assessment and Plan    Diagnoses and all orders for this visit:    1. Hypothyroidism due to Hashimoto's thyroiditis (Primary)  Clinically euthyroid on levothyroxine 100 mcg daily with recent TSH in the mid normal range.  Patient felt slightly better on the higher dose of medication though TSH was suppressed.  Due to childbearing age and future plans for children, will target TSH less than 2.5.  Increase levothyroxine to 112 mcg daily.    Recheck TFTs in 6 weeks.  -     T4, Free; Future  -     TSH; Future  -     levothyroxine (Synthroid) 112 MCG tablet; Take 1 tablet by mouth Daily.  Dispense: 30 tablet; Refill: 5    2. Celiac disease  Celiac disease controlled with diet.  Discussed that generic levothyroxine does contain trace amounts of gluten but patient has no GI side effects and prefers to remain on generic formulations due to cost savings.  Discussed the option to transition to gluten free brand medication in the future if needed.       Return in about 6 months (around 5/30/2022) for next scheduled follow up. The patient was instructed to contact the clinic with any interval questions or concerns.    Kaylee SALGUERO  DO [place]   Endocrinologist    Please note that portions of this note were completed with a voice recognition program.

## 2022-01-19 PROCEDURE — U0004 COV-19 TEST NON-CDC HGH THRU: HCPCS | Performed by: NURSE PRACTITIONER

## 2022-02-25 DIAGNOSIS — E03.8 HYPOTHYROIDISM DUE TO HASHIMOTO'S THYROIDITIS: ICD-10-CM

## 2022-02-25 DIAGNOSIS — E06.3 HYPOTHYROIDISM DUE TO HASHIMOTO'S THYROIDITIS: ICD-10-CM

## 2022-02-26 LAB
T4 SERPL-MCNC: 8.3 UG/DL (ref 4.5–12)
TSH SERPL DL<=0.005 MIU/L-ACNC: 3.14 UIU/ML (ref 0.45–4.5)

## 2022-02-28 DIAGNOSIS — E03.8 HYPOTHYROIDISM DUE TO HASHIMOTO'S THYROIDITIS: ICD-10-CM

## 2022-02-28 DIAGNOSIS — E06.3 HYPOTHYROIDISM DUE TO HASHIMOTO'S THYROIDITIS: ICD-10-CM

## 2022-02-28 RX ORDER — LEVOTHYROXINE SODIUM 0.12 MG/1
125 TABLET ORAL DAILY
Qty: 30 TABLET | Refills: 5 | Status: SHIPPED | OUTPATIENT
Start: 2022-02-28 | End: 2022-06-02 | Stop reason: SDUPTHER

## 2022-06-01 ENCOUNTER — OFFICE VISIT (OUTPATIENT)
Dept: ENDOCRINOLOGY | Facility: CLINIC | Age: 27
End: 2022-06-01

## 2022-06-01 ENCOUNTER — LAB (OUTPATIENT)
Dept: LAB | Facility: HOSPITAL | Age: 27
End: 2022-06-01

## 2022-06-01 VITALS
HEIGHT: 63 IN | BODY MASS INDEX: 26.93 KG/M2 | OXYGEN SATURATION: 98 % | DIASTOLIC BLOOD PRESSURE: 56 MMHG | WEIGHT: 152 LBS | HEART RATE: 105 BPM | SYSTOLIC BLOOD PRESSURE: 104 MMHG

## 2022-06-01 DIAGNOSIS — E03.8 HYPOTHYROIDISM DUE TO HASHIMOTO'S THYROIDITIS: ICD-10-CM

## 2022-06-01 DIAGNOSIS — E03.8 HYPOTHYROIDISM DUE TO HASHIMOTO'S THYROIDITIS: Primary | ICD-10-CM

## 2022-06-01 DIAGNOSIS — E06.3 HYPOTHYROIDISM DUE TO HASHIMOTO'S THYROIDITIS: Primary | ICD-10-CM

## 2022-06-01 DIAGNOSIS — E06.3 HYPOTHYROIDISM DUE TO HASHIMOTO'S THYROIDITIS: ICD-10-CM

## 2022-06-01 PROBLEM — K90.0 CELIAC DISEASE: Status: ACTIVE | Noted: 2017-03-21

## 2022-06-01 PROBLEM — E03.9 ACQUIRED HYPOTHYROIDISM: Status: ACTIVE | Noted: 2017-03-21

## 2022-06-01 LAB
T4 FREE SERPL-MCNC: 1.42 NG/DL (ref 0.93–1.7)
TSH SERPL DL<=0.05 MIU/L-ACNC: 0.09 UIU/ML (ref 0.27–4.2)

## 2022-06-01 PROCEDURE — 84439 ASSAY OF FREE THYROXINE: CPT | Performed by: INTERNAL MEDICINE

## 2022-06-01 PROCEDURE — 99213 OFFICE O/P EST LOW 20 MIN: CPT | Performed by: INTERNAL MEDICINE

## 2022-06-01 PROCEDURE — 84443 ASSAY THYROID STIM HORMONE: CPT | Performed by: INTERNAL MEDICINE

## 2022-06-01 NOTE — PROGRESS NOTES
"Chief Complaint   Patient presents with   • Hashimoto's Thyroiditis   • Hypothyroidism        HPI   Adelaida Grey is a 26 y.o. female had concerns including Hashimoto's Thyroiditis and Hypothyroidism.      Dose of levothyroxine has been titrated up over the last 2 months.  Last dose increase to 125 mcg the end of February for TSH at 3.14.  Weight has fluctuated over the last year, initial weight loss, weight has been trending up since January, up 12 pounds.    She takes levothyroxine 125 mcg daily in the morning with no missed doses.    Feels \"ok\".     Is trying to eat better. Over the winter wasn't eating as well.     The following portions of the patient's history were reviewed and updated as appropriate: allergies, current medications, past family history, past medical history, past social history, past surgical history and problem list.    Review of Systems   Constitutional: Negative.    Endocrine: Negative.         /56   Pulse 105   Ht 160 cm (63\")   Wt 68.9 kg (152 lb)   SpO2 98%   BMI 26.93 kg/m²      Physical Exam  Vitals reviewed.   Constitutional:       Appearance: Normal appearance.   Cardiovascular:      Rate and Rhythm: Normal rate.   Pulmonary:      Effort: Pulmonary effort is normal.   Neurological:      General: No focal deficit present.      Mental Status: She is alert. Mental status is at baseline.   Psychiatric:         Mood and Affect: Mood normal.         Behavior: Behavior normal.            TSH  Lab Results   Component Value Date    TSH 3.140 02/25/2022    TSH 2.870 10/14/2021    TSH 0.062 (L) 09/02/2021     T4  Lab Results   Component Value Date    FREET4 1.53 09/02/2021     Lab Results   Component Value Date    W8QHAHN 8.3 02/25/2022       Assessment and Plan    Diagnoses and all orders for this visit:    1. Hypothyroidism due to Hashimoto's thyroiditis (Primary)  Clinically euthyroid on levothyroxine 125 mcg daily. Weight has been trending up - resulting in needed dose " adjustments. Is taking as directed with no missed doses.   Recheck labs today. Titrate levothyroxine if able/needed.        Return in about 6 months (around 12/1/2022) for next scheduled follow up. The patient was instructed to contact the clinic with any interval questions or concerns.    Kaylee Kincaid, DO   Endocrinologist    Please note that portions of this note were completed with a voice recognition program.

## 2022-06-02 DIAGNOSIS — E03.8 HYPOTHYROIDISM DUE TO HASHIMOTO'S THYROIDITIS: ICD-10-CM

## 2022-06-02 DIAGNOSIS — E06.3 HYPOTHYROIDISM DUE TO HASHIMOTO'S THYROIDITIS: ICD-10-CM

## 2022-06-02 RX ORDER — LEVOTHYROXINE SODIUM 112 UG/1
112 TABLET ORAL DAILY
Qty: 30 TABLET | Refills: 4 | Status: SHIPPED | OUTPATIENT
Start: 2022-06-02 | End: 2022-07-02 | Stop reason: SDUPTHER

## 2022-07-02 DIAGNOSIS — E06.3 HYPOTHYROIDISM DUE TO HASHIMOTO'S THYROIDITIS: ICD-10-CM

## 2022-07-02 DIAGNOSIS — E03.8 HYPOTHYROIDISM DUE TO HASHIMOTO'S THYROIDITIS: ICD-10-CM

## 2022-07-05 RX ORDER — LEVOTHYROXINE SODIUM 112 UG/1
112 TABLET ORAL DAILY
Qty: 90 TABLET | Refills: 1 | Status: SHIPPED | OUTPATIENT
Start: 2022-07-05 | End: 2022-12-19

## 2022-07-14 LAB
T4 FREE SERPL-MCNC: 1.92 NG/DL (ref 0.93–1.7)
TSH SERPL DL<=0.005 MIU/L-ACNC: 0.58 UIU/ML (ref 0.27–4.2)

## 2022-08-29 ENCOUNTER — OFFICE VISIT (OUTPATIENT)
Dept: INTERNAL MEDICINE | Facility: CLINIC | Age: 27
End: 2022-08-29

## 2022-08-29 VITALS
DIASTOLIC BLOOD PRESSURE: 72 MMHG | RESPIRATION RATE: 16 BRPM | SYSTOLIC BLOOD PRESSURE: 112 MMHG | HEART RATE: 82 BPM | HEIGHT: 63 IN | OXYGEN SATURATION: 99 % | TEMPERATURE: 99.3 F | BODY MASS INDEX: 28.17 KG/M2 | WEIGHT: 159 LBS

## 2022-08-29 DIAGNOSIS — J10.1 INFLUENZA A: ICD-10-CM

## 2022-08-29 DIAGNOSIS — J10.1 INFLUENZA B: ICD-10-CM

## 2022-08-29 DIAGNOSIS — N61.0 MASTITIS: Primary | ICD-10-CM

## 2022-08-29 PROCEDURE — 99213 OFFICE O/P EST LOW 20 MIN: CPT | Performed by: INTERNAL MEDICINE

## 2022-08-29 RX ORDER — DOXYCYCLINE HYCLATE 100 MG/1
100 CAPSULE ORAL 2 TIMES DAILY
Qty: 20 CAPSULE | Refills: 0 | Status: SHIPPED | OUTPATIENT
Start: 2022-08-29 | End: 2022-11-21

## 2022-08-29 NOTE — PROGRESS NOTES
"Subjective     Patient ID: Adelaida Grey is a 27 y.o. female. Patient is here for management of multiple medical problems.     Chief Complaint   Patient presents with   • Breast Problem     Follow up from [facility], infection of L nipple;  Sore throat X2-3 days, close exposure to Covid     History of Present Illness   Left breast with trauma from cat claw. Discharge improved with keflex. Not resolved.      The following portions of the patient's history were reviewed and updated as appropriate: allergies, current medications, past family history, past medical history, past social history, past surgical history and problem list.    Review of Systems   Constitutional: Negative for fatigue.   Musculoskeletal: Negative for joint swelling.   Psychiatric/Behavioral: Negative for self-injury and sleep disturbance.   All other systems reviewed and are negative.      Current Outpatient Medications:   •  ibuprofen (ADVIL,MOTRIN) 200 MG tablet, Take 200 mg by mouth Every 6 (Six) Hours As Needed for Mild Pain ., Disp: , Rfl:   •  levonorgestrel (KYLEENA) 19.5 MG intrauterine device IUD, Kyleena 17.5 mcg/24 hrs (5yrs) 19.5mg intrauterine device, Disp: , Rfl:   •  levothyroxine (Synthroid) 112 MCG tablet, Take 1 tablet by mouth Daily., Disp: 90 tablet, Rfl: 1  •  mupirocin (BACTROBAN) 2 % ointment, Apply to affected area bid-tid, Disp: 30 g, Rfl: 0  •  doxycycline (VIBRAMYCIN) 100 MG capsule, Take 1 capsule by mouth 2 (Two) Times a Day., Disp: 20 capsule, Rfl: 0    Objective      Blood pressure 112/72, pulse 82, temperature 99.3 °F (37.4 °C), temperature source Temporal, resp. rate 16, height 160 cm (63\"), weight 72.1 kg (159 lb), SpO2 99 %, not currently breastfeeding.    Physical Exam     General Appearance:    Alert, cooperative, no distress, appears stated age   Head:    Normocephalic, without obvious abnormality, atraumatic   Eyes:    PERRL, conjunctiva/corneas clear, EOM's intact   Ears:    Normal TM's and external ear " canals, both ears   Nose:   Nares normal, septum midline, mucosa normal, no drainage   or sinus tenderness   Throat:   Lips, mucosa, and tongue normal; teeth and gums normal   Neck:   Supple, symmetrical, trachea midline, no adenopathy;        thyroid:  No enlargement/tenderness/nodules; no carotid    bruit or JVD   Back:     Symmetric, no curvature, ROM normal, no CVA tenderness   Lungs:     Clear to auscultation bilaterally, respirations unlabored   Chest wall:    No tenderness or deformity   Heart:    Regular rate and rhythm, S1 and S2 normal, no murmur,        rub or gallop   Abdomen:     Soft, non-tender, bowel sounds active all four quadrants,     no masses, no organomegaly   Extremities:   Extremities normal, atraumatic, no cyanosis or edema   Pulses:   2+ and symmetric all extremities   Skin:   Skin color, texture, turgor normal, no rashes or lesions   Lymph nodes:   Cervical, supraclavicular, and axillary nodes normal   Neurologic:   CNII-XII intact. Normal strength, sensation and reflexes       throughout    Breast with nipple piercing on left side.     Results for orders placed or performed during the hospital encounter of 08/18/22   Culture, Routine - Swab, Breast, Left Central    Specimen: Breast, Left Central; Swab   Result Value Ref Range    Culture Final report     Result 1 Comment          Assessment & Plan       Diagnoses and all orders for this visit:    1. Mastitis (Primary)    2. Influenza A    3. Influenza B    Other orders  -     doxycycline (VIBRAMYCIN) 100 MG capsule; Take 1 capsule by mouth 2 (Two) Times a Day.  Dispense: 20 capsule; Refill: 0      No follow-ups on file.          There are no Patient Instructions on file for this visit.     Christ Schultz MD    Assessment & Plan

## 2022-09-01 ENCOUNTER — TELEPHONE (OUTPATIENT)
Dept: INTERNAL MEDICINE | Facility: CLINIC | Age: 27
End: 2022-09-01

## 2022-09-22 DIAGNOSIS — R23.4 BREAST SKIN CHANGES: Primary | ICD-10-CM

## 2022-09-23 DIAGNOSIS — N61.0 MASTITIS: Primary | ICD-10-CM

## 2022-09-29 ENCOUNTER — OFFICE VISIT (OUTPATIENT)
Dept: SURGERY | Facility: CLINIC | Age: 27
End: 2022-09-29

## 2022-09-29 VITALS
TEMPERATURE: 98.4 F | WEIGHT: 155.4 LBS | SYSTOLIC BLOOD PRESSURE: 122 MMHG | BODY MASS INDEX: 27.54 KG/M2 | HEIGHT: 63 IN | HEART RATE: 98 BPM | DIASTOLIC BLOOD PRESSURE: 62 MMHG | OXYGEN SATURATION: 99 % | RESPIRATION RATE: 18 BRPM

## 2022-09-29 DIAGNOSIS — S29.9XXD: Primary | ICD-10-CM

## 2022-10-04 ENCOUNTER — APPOINTMENT (OUTPATIENT)
Dept: ULTRASOUND IMAGING | Facility: HOSPITAL | Age: 27
End: 2022-10-04

## 2022-11-17 ENCOUNTER — OFFICE VISIT (OUTPATIENT)
Dept: OBSTETRICS AND GYNECOLOGY | Facility: CLINIC | Age: 27
End: 2022-11-17

## 2022-11-17 VITALS
BODY MASS INDEX: 25.88 KG/M2 | SYSTOLIC BLOOD PRESSURE: 120 MMHG | DIASTOLIC BLOOD PRESSURE: 60 MMHG | HEIGHT: 66 IN | WEIGHT: 161 LBS

## 2022-11-17 DIAGNOSIS — R63.5 WEIGHT GAIN: ICD-10-CM

## 2022-11-17 DIAGNOSIS — Z12.39 ENCOUNTER FOR BREAST CANCER SCREENING OTHER THAN MAMMOGRAM: ICD-10-CM

## 2022-11-17 DIAGNOSIS — E03.9 ACQUIRED HYPOTHYROIDISM: ICD-10-CM

## 2022-11-17 DIAGNOSIS — Z01.419 ENCOUNTER FOR GYNECOLOGICAL EXAMINATION (GENERAL) (ROUTINE) WITHOUT ABNORMAL FINDINGS: Primary | ICD-10-CM

## 2022-11-17 PROCEDURE — 99213 OFFICE O/P EST LOW 20 MIN: CPT | Performed by: OBSTETRICS & GYNECOLOGY

## 2022-11-17 PROCEDURE — 99395 PREV VISIT EST AGE 18-39: CPT | Performed by: OBSTETRICS & GYNECOLOGY

## 2022-11-17 RX ORDER — ASCORBIC ACID 100 MG
TABLET,CHEWABLE ORAL
COMMUNITY

## 2022-11-21 LAB — REF LAB TEST METHOD: NORMAL

## 2022-11-21 NOTE — PROGRESS NOTES
Chief Complaint  Gynecologic Exam     History of Present Illness:  Patient is 27 y.o.  who presents to [facility] here for her annual examination.  Patient reports scant irregular bleeding.  She currently has Kyleena IUD which was placed in 2020.  Patient has continued on her Synthroid.  She does see an endocrinologist.  Patient is due to have repeat labs.  Patient does have complaints of weight gain.  She does report however having changes in her activities as well as eating habits.  Patient is less active at present.    History  Past Medical History:   Diagnosis Date   • Abnormal Pap smear of cervix 2018   • Celiac disease    • Encounter for IUD insertion 2020    Kyleena   • HPV (human papilloma virus) infection    • Hypothyroidism     Hashimotos   • Migraine    • Ovarian cyst    • PMS (premenstrual syndrome)    • Urinary tract infection 2021    Currently on medication to treat   • Varicella 1996     Current Outpatient Medications on File Prior to Visit   Medication Sig Dispense Refill   • Ascorbic Acid (Vitamin C) 100 MG chewable tablet Chew.     • ibuprofen (ADVIL,MOTRIN) 200 MG tablet Take 200 mg by mouth Every 6 (Six) Hours As Needed for Mild Pain .     • levonorgestrel (KYLEENA) 19.5 MG intrauterine device IUD Kyleena 17.5 mcg/24 hrs (5yrs) 19.5mg intrauterine device     • levothyroxine (Synthroid) 112 MCG tablet Take 1 tablet by mouth Daily. 90 tablet 1   • vitamin D3 125 MCG (5000 UT) capsule capsule Take 1,000 Units by mouth 2 (Two) Times a Day.     • doxycycline (VIBRAMYCIN) 100 MG capsule Take 1 capsule by mouth 2 (Two) Times a Day. 20 capsule 0   • mupirocin (BACTROBAN) 2 % ointment Apply to affected area bid-tid 30 g 0     No current facility-administered medications on file prior to visit.     Allergies   Allergen Reactions   • Azithromycin Rash   • Cephalosporins Rash   • Glutethimides Rash   • Penicillins Rash   • Sulfa Antibiotics Rash     Past  "Surgical History:   Procedure Laterality Date   • APPENDECTOMY  2010   • BUNIONECTOMY Bilateral    • WISDOM TOOTH EXTRACTION  2011     Family History   Problem Relation Age of Onset   • Thyroid disease Father         Hyperthyroidism   • Thyroid disease Mother         Hypothyroidism   • Celiac disease Mother    • No Known Problems Brother    • Thyroid disease Sister         Hypothyroidism   • Celiac disease Sister    • No Known Problems Son    • No Known Problems Daughter    • No Known Problems Paternal Grandfather    • No Known Problems Paternal Grandmother    • No Known Problems Maternal Grandmother    • No Known Problems Maternal Grandfather    • No Known Problems Maternal Aunt    • No Known Problems Maternal Uncle    • No Known Problems Paternal Aunt    • No Known Problems Paternal Uncle      Social History     Socioeconomic History   • Marital status: Single   Tobacco Use   • Smoking status: Never   • Smokeless tobacco: Never   Vaping Use   • Vaping Use: Never used   Substance and Sexual Activity   • Alcohol use: Yes     Alcohol/week: 2.0 standard drinks     Types: 2 Glasses of wine per week     Comment: Occasional, it varies.   • Drug use: Never   • Sexual activity: Yes     Partners: Male     Birth control/protection: I.U.D.     Comment: Kyleena I.U.D, occasional condom use       Physical Examination:  Vital Signs: /60   Ht 167.6 cm (66\")   Wt 73 kg (161 lb)   BMI 25.99 kg/m²     General Appearance: alert, appears stated age, and cooperative  Breasts: Examined in supine position  Symmetric without masses or skin dimpling  Nipples normal without inversion, lesions or discharge  There are no palpable axillary nodes  Abdomen: no masses, no hepatomegaly, no splenomegaly, soft non-tender, no guarding, and no rebound tenderness  Pelvic: Clinical staff was present for exam  External genitalia:  normal appearance of the external genitalia including Bartholin's and [place]'s glands.  :  urethral meatus " normal;  Vaginal:  normal pink mucosa without prolapse or lesions.  Cervix:  normal appearance.  Uterus:  normal size, shape and consistency.  Adnexa:  normal bimanual exam of the adnexa.  Pap smear done and specimen sent using Thin-Prep technique    Data Review:  The following data was reviewed by: Ashly Adames MD on 11/17/2022:     Labs:  T4, Free (07/14/2022 08:55)  TSH (07/14/2022 08:55)    Imaging:    Medical Records:  Progress Notes by Kaylee Kincaid DO (06/01/2022 08:00)      Assessment and Plan   1. Encounter for gynecological examination (general) (routine) without abnormal findings  Pap was done today.  If she does not receive the results of the Pap within 2 weeks  time, she was instructed to call to find out the results.  I explained to Adelaida that the recommendations for Pap smear interval in a low risk patient  has lengthened to 3 years time.  I encouraged her to be seen yearly for a full physical exam including breast and pelvic exam even during the off years when PAP's will not be performed.  - LIQUID-BASED PAP SMEAR, [facility] ([facility])    2. Encounter for breast cancer screening other than mammogram  Adelaida was counseled regarding having clinical breast exams and breast self-awareness.  Women aged 29-39 years of age should have clinical breast exams every 1-3 years and yearly aged 40 and older.  The patient was counseled regarding breast self-awareness focusing on having a sense of what is normal for her breasts so that she can tell if there are changes.  Even small changes should be reported to provider.    3. Acquired hypothyroidism  Patient with acquired hypothyroidism.  Orders have been placed as noted.  Patient is to follow-up with her endocrinologist as discussed.  She is to continue her current dose of Synthroid.  - T4, Free  - TSH    4. Weight gain  Patient with complaints of weight gain.  She is due to have repeat labs as noted.  Plan pending results.  Patient may also need to  consider evaluation with a dietitian.  - T4, Free  - TSH    Follow Up/Instructions:  Follow up as noted.  Patient was given instructions and counseling regarding her condition or for health maintenance advice. Please see specific information pulled into the AVS if appropriate.     Note: Speech recognition transcription software may have been used to dictate portions of this document.  An attempt at proofreading has been made though minor errors in transcription may still be present.    This note was electronically signed.  Ashly Adames M.D.

## 2022-11-23 LAB
T4 FREE SERPL-MCNC: 1.52 NG/DL (ref 0.93–1.7)
TSH SERPL DL<=0.005 MIU/L-ACNC: 0.44 UIU/ML (ref 0.27–4.2)

## 2022-12-13 ENCOUNTER — TELEPHONE (OUTPATIENT)
Dept: INTERNAL MEDICINE | Facility: CLINIC | Age: 27
End: 2022-12-13

## 2022-12-13 NOTE — TELEPHONE ENCOUNTER
Caller: Que Adelaida    Relationship: Self    Best call back number:  [phone]     What form or medical record are you requesting: FMLA PAPERWORK     SHE IS WANTING TO SEE IF [employer] CAN FILL THIS PAPERWORK OUT FOR HER     Timeframe paperwork needed:  BEFORE 12-16-22    Additional notes:  PATIENT WENT TO [facility] AND TESTED POSTIVE FOR  COVID ON 11-27-22  SHE WAS OFF WORK FOR 11-28-22, 11-29-22 AND 11-30-22  AND SHE IS REQUESTING FMLA   THE URGENT CARE TOLD HER THEY CANNOT HELP HER WITH THIS

## 2022-12-14 NOTE — TELEPHONE ENCOUNTER
It really depends on how the paperwork is worded. If she could get that sent over to us along with record of her positive result I would be happy to look at it. Sometimes she needs an office visit, it depends on the company.

## 2022-12-18 DIAGNOSIS — E06.3 HYPOTHYROIDISM DUE TO HASHIMOTO'S THYROIDITIS: ICD-10-CM

## 2022-12-18 DIAGNOSIS — E03.8 HYPOTHYROIDISM DUE TO HASHIMOTO'S THYROIDITIS: ICD-10-CM

## 2022-12-19 RX ORDER — LEVOTHYROXINE SODIUM 112 UG/1
112 TABLET ORAL DAILY
Qty: 90 TABLET | Refills: 0 | Status: SHIPPED | OUTPATIENT
Start: 2022-12-19 | End: 2023-02-09 | Stop reason: SDUPTHER

## 2023-01-20 ENCOUNTER — OFFICE VISIT (OUTPATIENT)
Dept: INTERNAL MEDICINE | Facility: CLINIC | Age: 28
End: 2023-01-20
Payer: COMMERCIAL

## 2023-01-20 VITALS
OXYGEN SATURATION: 98 % | BODY MASS INDEX: 26.54 KG/M2 | TEMPERATURE: 97.8 F | HEIGHT: 66 IN | DIASTOLIC BLOOD PRESSURE: 74 MMHG | WEIGHT: 165.12 LBS | HEART RATE: 89 BPM | SYSTOLIC BLOOD PRESSURE: 120 MMHG

## 2023-01-20 DIAGNOSIS — K64.9 ACUTE HEMORRHOID: ICD-10-CM

## 2023-01-20 DIAGNOSIS — Z00.00 ANNUAL PHYSICAL EXAM: Primary | ICD-10-CM

## 2023-01-20 DIAGNOSIS — M25.511 ACUTE PAIN OF RIGHT SHOULDER: ICD-10-CM

## 2023-01-20 DIAGNOSIS — E03.8 HYPOTHYROIDISM DUE TO HASHIMOTO'S THYROIDITIS: ICD-10-CM

## 2023-01-20 DIAGNOSIS — E06.3 HYPOTHYROIDISM DUE TO HASHIMOTO'S THYROIDITIS: ICD-10-CM

## 2023-01-20 PROCEDURE — 99395 PREV VISIT EST AGE 18-39: CPT | Performed by: NURSE PRACTITIONER

## 2023-01-20 RX ORDER — HYDROCORTISONE ACETATE 25 MG/1
25 SUPPOSITORY RECTAL 2 TIMES DAILY
Qty: 14 SUPPOSITORY | Refills: 0 | Status: SHIPPED | OUTPATIENT
Start: 2023-01-20 | End: 2023-01-27

## 2023-01-20 NOTE — PROGRESS NOTES
Subjective   Adelaida Grey is a 27 y.o. female and is here for a comprehensive physical exam. The patient reports problems - hemorrhoid and shoulder pain.    HPI: here today for annual physical exam with above acute complaints.   Hemorrhoid- started about 2 months ago. Feels like it is external. She is having blood when she wipes after a bowel movement. Doesn't necessarily have pain with bowel movements. She doesn't typically have issues with constipation. She does do a lot of sitting at her job but is not on her feet a lot or lift heavy weights.   Right shoulder pain-   She is also concerned with her weight. Seems like she gains weight with every visit. Thinks she has gained about 20-30 pounds over the last year. She suffers from hypothyoridism, established with endocrinology. She is not getting structured exercise currently.   Right shoulder has been hurting for about 4 months. Feels like it catches by her collar bone. No known trauma or injury. Denies neck pain, numbness/tingling into the hand, and weakness of the hand.       Health Habits:  Eye exam within last 2 years? Yes.   Dental exam every 6 months? Yes.  Exercise habits: No structured exercise.   Healthy diet? Trying to work on her diet to lose weight for her wedding.     The ASCVD Risk score (Deanna DK, et al., 2019) failed to calculate for the following reasons:    The 2019 ASCVD risk score is only valid for ages 40 to 79        Do you take any herbs or supplements that were not prescribed by a doctor? no  Are you taking calcium supplements? No  Are you taking aspirin daily? No     History:  LMP: No LMP recorded. Patient has had an implant.  Menopause: No  Last pap date: 22.  Abnormal pap? yes         OB History    Para Term  AB Living   0 0 0 0 0 0   SAB IAB Ectopic Molar Multiple Live Births   0 0 0 0 0 0      reports being sexually active and has had partner(s) who are male. She reports using the following method of  birth control/protection: I.U.D..        The following portions of the patient's history were reviewed and updated as appropriate: She  has a past medical history of Abnormal Pap smear of cervix (04/2018), Celiac disease, Encounter for IUD insertion (07/09/2020), History of medical problems (2004), HPV (human papilloma virus) infection, Hypothyroidism (2001), Migraine, Ovarian cyst, PMS (premenstrual syndrome), Urinary tract infection (08/2021), and Varicella (05/1996).  She does not have any pertinent problems on file.  She  has a past surgical history that includes Bunionectomy (Bilateral); Appendectomy (2010); and [place] tooth extraction (2011).  Her family history includes Asthma in her sister; Celiac disease in her mother and sister; Miscarriages / Stillbirths in her mother; No Known Problems in her brother, daughter, maternal aunt, maternal grandfather, maternal grandmother, maternal uncle, paternal aunt, paternal grandfather, paternal grandmother, paternal uncle, and son; Thyroid disease in her father, mother, and sister.  She  reports that she has never smoked. She has never used smokeless tobacco. She reports current alcohol use of about 4.0 standard drinks per week. She reports that she does not use drugs.  Current Outpatient Medications   Medication Sig Dispense Refill   • Ascorbic Acid (Vitamin C) 100 MG chewable tablet Chew.     • ibuprofen (ADVIL,MOTRIN) 200 MG tablet Take 200 mg by mouth Every 6 (Six) Hours As Needed for Mild Pain .     • levonorgestrel (KYLEENA) 19.5 MG intrauterine device IUD Kyleena 17.5 mcg/24 hrs (5yrs) 19.5mg intrauterine device     • levothyroxine (SYNTHROID, LEVOTHROID) 112 MCG tablet Take 1 tablet by mouth Daily. MUST KEEP APPT. FOR FUTURE REFILLS 90 tablet 0   • vitamin D3 125 MCG (5000 UT) capsule capsule Take 1,000 Units by mouth 2 (Two) Times a Day.       No current facility-administered medications for this visit.       Review of Systems  Do you have pain that bothers  "you in your daily life? no    Review of Systems   Constitutional: Negative for appetite change, fatigue and fever.        Weight gain     HENT: Negative for congestion, sore throat and trouble swallowing.    Eyes: Negative for blurred vision and visual disturbance.   Respiratory: Negative for cough, shortness of breath and wheezing.    Cardiovascular: Negative for chest pain, palpitations and leg swelling.   Gastrointestinal: Positive for anal bleeding. Negative for abdominal pain, blood in stool, constipation, diarrhea and nausea.   Endocrine: Negative for polydipsia, polyphagia and polyuria.   Genitourinary: Negative for dysuria.   Musculoskeletal: Positive for arthralgias. Negative for back pain and myalgias.   Skin: Negative for rash.   Neurological: Negative for dizziness, weakness, light-headedness and headache.   Psychiatric/Behavioral: Negative for sleep disturbance and depressed mood. The patient is not nervous/anxious.          Objective   /74 (BP Location: Right arm, Patient Position: Sitting, Cuff Size: Large Adult)   Pulse 89   Temp 97.8 °F (36.6 °C)   Ht 167.6 cm (66\")   Wt 74.9 kg (165 lb 1.9 oz)   SpO2 98%   BMI 26.65 kg/m²     Physical Exam  Vitals and nursing note reviewed. Exam conducted with a chaperone present (Trish Kaur CMA).   Constitutional:       General: She is not in acute distress.     Appearance: Normal appearance. She is obese.   HENT:      Right Ear: Tympanic membrane and ear canal normal.      Left Ear: Tympanic membrane and ear canal normal.      Nose: Nose normal.      Mouth/Throat:      Mouth: Mucous membranes are moist.      Pharynx: Oropharynx is clear. No posterior oropharyngeal erythema.   Eyes:      Extraocular Movements: Extraocular movements intact.      Pupils: Pupils are equal, round, and reactive to light.   Neck:      Thyroid: No thyroid mass or thyromegaly.      Vascular: No carotid bruit.   Cardiovascular:      Rate and Rhythm: Normal rate and regular " rhythm.      Pulses: Normal pulses.      Heart sounds: Normal heart sounds. No murmur heard.  Pulmonary:      Effort: Pulmonary effort is normal.      Breath sounds: Normal breath sounds. No wheezing.   Abdominal:      General: Bowel sounds are normal. There is no distension.      Palpations: Abdomen is soft. There is no mass.      Tenderness: There is no abdominal tenderness.   Genitourinary:     Rectum: Tenderness and external hemorrhoid (6 o'clock, tender but not inflammed or acutely bleeding) present.      Comments: Deferred, established with GYN    Musculoskeletal:         General: No deformity. Normal range of motion.      Right shoulder: Tenderness and bony tenderness present. No swelling. Normal range of motion. Normal strength.        Arms:       Cervical back: Normal range of motion and neck supple. No muscular tenderness.   Lymphadenopathy:      Head:      Right side of head: No submandibular, tonsillar, preauricular or posterior auricular adenopathy.      Left side of head: No submandibular, tonsillar, preauricular or posterior auricular adenopathy.      Cervical: No cervical adenopathy.   Skin:     General: Skin is warm and dry.      Capillary Refill: Capillary refill takes less than 2 seconds.      Findings: No bruising or rash.   Neurological:      General: No focal deficit present.      Mental Status: She is alert and oriented to person, place, and time.      Gait: Gait normal.      Deep Tendon Reflexes: Reflexes normal.   Psychiatric:         Mood and Affect: Mood normal.         Behavior: Behavior normal.            Assessment & Plan   Healthy female exam.    Diagnosis Plan   1. Annual physical exam  Preventative maintenance discussed and information provided in AVS. Healthy diet, exercise as tolerated, vaccines encouraged, and monthly self breast exams. Keep follow-up with GYN for well woman care.       2. Hypothyroidism due to Hashimoto's thyroiditis  Managed by endocrinology, keep scheduled  follow-up and continue levothyroxine at current dose. Will hold on further labs for now as she has an appointment next month. Clinically euthyroid 3 months ago.       3. Acute hemorrhoid  Recommend SITZ bath, anusol suppository BID x 7 days ,avoid straining, and miralax as needed for constipation.       4. Acute pain of right shoulder  XR Shoulder 2+ View Right    Unclear etiology. Can not reproduce catching sensation on exam today. X-ray to rule out acute bony abnormality. NSAIDs, heat/ice, and stretching recommended. May need formal PT if not beneficial.             2. Patient Counseling:  --Nutrition: Stressed importance of moderation in sodium/caffeine intake, saturated fat and cholesterol, caloric balance, sufficient intake of fresh fruits, vegetables, fiber, calcium, iron, and 1 g folate supplementation if of childbearing age.   --Discussed the issue of calcium supplement, and the daily use of baby aspirin if applicable.             --Mammogram recommended every 2 years from age 40-49 and yearly beginning at age 50.  --Exercise: Stressed the importance of regular exercise.   --Substance Abuse: Discussed cessation/primary prevention of tobacco (if applicable), alcohol, or other drug use (if applicable); driving or other dangerous activities under the influence; availability of treatment for abuse.    --Sexuality: Discussed sexually transmitted diseases, partner selection, use of condoms, avoidance of unintended pregnancy  and contraceptive alternatives.   --Injury prevention: Discussed safety belts, safety helmets, smoke detector, smoking near bedding or upholstery.   --Dental health: Discussed importance of regular tooth brushing, flossing, and dental visits every 6 months.  --Immunizations reviewed.  --Discussed benefits of screening colonoscopy (if applicable).  --After hours service discussed with patient  3. Discussed the patient's BMI with her.  The BMI is above average; BMI management plan is  completed  4. Return in about 1 year (around 1/20/2024) for Annual.     Na Becker, KVNG  01/20/2023  16:30 EST

## 2023-02-09 ENCOUNTER — OFFICE VISIT (OUTPATIENT)
Dept: ENDOCRINOLOGY | Facility: CLINIC | Age: 28
End: 2023-02-09
Payer: COMMERCIAL

## 2023-02-09 VITALS
HEART RATE: 84 BPM | BODY MASS INDEX: 26.52 KG/M2 | DIASTOLIC BLOOD PRESSURE: 70 MMHG | OXYGEN SATURATION: 98 % | HEIGHT: 66 IN | SYSTOLIC BLOOD PRESSURE: 100 MMHG | WEIGHT: 165 LBS

## 2023-02-09 DIAGNOSIS — E06.3 HYPOTHYROIDISM DUE TO HASHIMOTO'S THYROIDITIS: Primary | ICD-10-CM

## 2023-02-09 DIAGNOSIS — K90.0 CELIAC DISEASE: ICD-10-CM

## 2023-02-09 DIAGNOSIS — E03.8 HYPOTHYROIDISM DUE TO HASHIMOTO'S THYROIDITIS: Primary | ICD-10-CM

## 2023-02-09 PROCEDURE — 99213 OFFICE O/P EST LOW 20 MIN: CPT | Performed by: INTERNAL MEDICINE

## 2023-02-09 RX ORDER — LEVOTHYROXINE SODIUM 112 UG/1
112 TABLET ORAL DAILY
Qty: 90 TABLET | Refills: 3 | Status: SHIPPED | OUTPATIENT
Start: 2023-02-09

## 2023-02-09 NOTE — PROGRESS NOTES
"Chief Complaint   Patient presents with   • Hypothyroidism        HPI   Adelaida Grey is a 27 y.o. female had concerns including Hypothyroidism.      Patient takes levothyroxine 112 mcg daily.  Dose last decreased last June for suppressed TSH, TFTs most recently in a normal and optimal range.    Has tolerated the generic levothyroxine without issue despite having celiac disease.     Recently had a death in the family.     Is losing insurance tomorrow due to transition in job status.    The following portions of the patient's history were reviewed and updated as appropriate: allergies, current medications, past family history, past medical history, past social history, past surgical history and problem list.    Review of Systems   Constitutional: Negative.    Gastrointestinal:        Occasional stomach upset   Endocrine: Negative.    Psychiatric/Behavioral: Positive for stress.        /70   Pulse 84   Ht 167.6 cm (66\")   Wt 74.8 kg (165 lb)   SpO2 98%   BMI 26.63 kg/m²      Physical Exam  Vitals reviewed.   Constitutional:       Appearance: Normal appearance.   Cardiovascular:      Rate and Rhythm: Normal rate.   Pulmonary:      Effort: Pulmonary effort is normal.   Neurological:      General: No focal deficit present.      Mental Status: She is alert. Mental status is at baseline.   Psychiatric:         Mood and Affect: Mood normal.         Behavior: Behavior normal.          LABS AND IMAGING     TSH  Lab Results   Component Value Date    TSH 0.436 11/23/2022    TSH 0.576 07/14/2022    TSH 0.090 (L) 06/01/2022     T4  Lab Results   Component Value Date    FREET4 1.52 11/23/2022    FREET4 1.92 (H) 07/14/2022    FREET4 1.42 06/01/2022     Lab Results   Component Value Date    N5VSDBR 8.3 02/25/2022     Assessment and Plan    Diagnoses and all orders for this visit:    1. Hypothyroidism due to Hashimoto's thyroiditis (Primary)  Clinically and biochemically euthyroid on levothyroxine 112 mcg daily. "   TFTs last checked in November and were in an optimal range.  Patient prefers to remain on levothyroxine despite having celiac disease.  Discussed again trace amounts of gluten in the levothyroxine and if she wants to try a gluten-free formulation I can send a prescription for alternate brand.  Can monitor TFTs yearly.  Patient prefers to follow here  -     levothyroxine (SYNTHROID, LEVOTHROID) 112 MCG tablet; Take 1 tablet by mouth Daily.  Dispense: 90 tablet; Refill: 3    2. Celiac disease  As above, controlled with diet.  No issues with generic levothyroxine.       Return in about 1 year (around 2/9/2024) for next scheduled follow up. The patient was instructed to contact the clinic with any interval questions or concerns.    Kaylee Kincaid, DO   Endocrinologist    Please note that portions of this note were completed with a voice recognition program.

## 2023-11-24 ENCOUNTER — TELEPHONE (OUTPATIENT)
Dept: URGENT CARE | Facility: CLINIC | Age: 28
End: 2023-11-24
Payer: COMMERCIAL

## 2023-11-24 DIAGNOSIS — A49.1 GROUP B STREPTOCOCCAL INFECTION: Primary | ICD-10-CM

## 2023-11-24 RX ORDER — CEPHALEXIN 500 MG/1
500 CAPSULE ORAL 3 TIMES DAILY
Qty: 21 CAPSULE | Refills: 0 | Status: SHIPPED | OUTPATIENT
Start: 2023-11-24

## 2024-02-02 ENCOUNTER — OFFICE VISIT (OUTPATIENT)
Dept: INTERNAL MEDICINE | Facility: CLINIC | Age: 29
End: 2024-02-02
Payer: COMMERCIAL

## 2024-02-02 VITALS
DIASTOLIC BLOOD PRESSURE: 84 MMHG | OXYGEN SATURATION: 99 % | BODY MASS INDEX: 27.5 KG/M2 | HEIGHT: 66 IN | SYSTOLIC BLOOD PRESSURE: 119 MMHG | WEIGHT: 171.12 LBS | TEMPERATURE: 98.4 F | HEART RATE: 87 BPM

## 2024-02-02 DIAGNOSIS — Z00.00 ANNUAL PHYSICAL EXAM: Primary | ICD-10-CM

## 2024-02-02 DIAGNOSIS — E06.3 HYPOTHYROIDISM DUE TO HASHIMOTO'S THYROIDITIS: ICD-10-CM

## 2024-02-02 DIAGNOSIS — S96.911A STRAIN OF RIGHT FOOT, INITIAL ENCOUNTER: ICD-10-CM

## 2024-02-02 DIAGNOSIS — E03.8 HYPOTHYROIDISM DUE TO HASHIMOTO'S THYROIDITIS: ICD-10-CM

## 2024-02-02 DIAGNOSIS — E66.3 OVERWEIGHT (BMI 25.0-29.9): ICD-10-CM

## 2024-02-02 NOTE — PROGRESS NOTES
Subjective   Adelaida Grey is a 28 y.o. female and is here for a comprehensive physical exam. The patient reports no problems.    HPI: here today for annual physical exam with no acute complaints.   Hypothyroidism- taking levothyroxine as prescribed. Denies changes in bowel habits, temperature intolerance, and fatigue. Managed by endocrinologist.   Twisted right ankle a few weeks ago and right lateral foot continues to be sore, feels like a deep bruise.  She is able to bear weight and ambulate.  Only when touched in certain spots is sore.  Full range of motion, no numbness or tingling, no color changes of the foot, and no coolness.  She declines Tdap today.    Health Habits:  Eye exam within last 2 years? Yes.   Dental exam every 6 months? No, will schedule.   Exercise habits: no structured exercise currently.   Healthy diet? Typical American diet     The ASCVD Risk score (Deanna GARZA, et al., 2019) failed to calculate for the following reasons:    The 2019 ASCVD risk score is only valid for ages 40 to 79        Do you take any herbs or supplements that were not prescribed by a doctor? yes  Are you taking calcium supplements? No  Are you taking aspirin daily? No     History:  LMP: No LMP recorded. Patient has had an implant.  Menopause: No  Last pap date: 2022  Abnormal pap? yes  Family history of breast or ovarian cancer: no         OB History    Para Term  AB Living   0 0 0 0 0 0   SAB IAB Ectopic Molar Multiple Live Births   0 0 0 0 0 0      reports being sexually active and has had partner(s) who are male. She reports using the following method of birth control/protection: I.U.D..        The following portions of the patient's history were reviewed and updated as appropriate: She  has a past medical history of Abnormal Pap smear of cervix (2018), Celiac disease, Encounter for IUD insertion (2020), History of medical problems (), HPV (human papilloma virus) infection,  "Hypothyroidism (2001), Migraine, Ovarian cyst, PMS (premenstrual syndrome), Urinary tract infection (08/2021), and Varicella (05/1996).  She does not have any pertinent problems on file.  She  has a past surgical history that includes Bunionectomy (Bilateral); Appendectomy (2010); and [place] tooth extraction (2011).  Her family history includes Asthma in her sister; Celiac disease in her mother and sister; Miscarriages / Stillbirths in her mother; No Known Problems in her brother, daughter, maternal aunt, maternal grandfather, maternal grandmother, maternal uncle, paternal aunt, paternal grandfather, paternal grandmother, paternal uncle, and son; Thyroid disease in her father, mother, and sister.  She  reports that she has never smoked. She has never been exposed to tobacco smoke. She has never used smokeless tobacco. She reports current alcohol use of about 4.0 standard drinks of alcohol per week. She reports that she does not use drugs.  Current Outpatient Medications   Medication Sig Dispense Refill    ibuprofen (ADVIL,MOTRIN) 200 MG tablet Take 1 tablet by mouth Every 6 (Six) Hours As Needed for Mild Pain.      levonorgestrel (KYLEENA) 19.5 MG intrauterine device IUD Kyleena 17.5 mcg/24 hrs (5yrs) 19.5mg intrauterine device      levothyroxine (SYNTHROID, LEVOTHROID) 112 MCG tablet Take 1 tablet by mouth Daily. 90 tablet 3    Magnesium Glycinate 100 MG capsule Take  by mouth.      vitamin D3 125 MCG (5000 UT) capsule capsule Take 1,000 Units by mouth 2 (Two) Times a Day.      cephalexin (KEFLEX) 500 MG capsule Take 1 capsule by mouth 3 (Three) Times a Day. 21 capsule 0     No current facility-administered medications for this visit.         Objective   /84   Pulse 87   Temp 98.4 °F (36.9 °C) (Tympanic)   Ht 167.6 cm (66\")   Wt 77.6 kg (171 lb 1.9 oz)   SpO2 99%   BMI 27.62 kg/m²     Physical Exam  Vitals and nursing note reviewed.   Constitutional:       General: She is not in acute distress.     " Appearance: Normal appearance.   HENT:      Right Ear: Tympanic membrane and ear canal normal.      Left Ear: Tympanic membrane and ear canal normal.      Nose: Nose normal.      Mouth/Throat:      Mouth: Mucous membranes are moist.      Pharynx: Oropharynx is clear. No posterior oropharyngeal erythema.   Eyes:      Extraocular Movements: Extraocular movements intact.      Pupils: Pupils are equal, round, and reactive to light.   Neck:      Thyroid: No thyroid mass or thyromegaly.      Vascular: No carotid bruit.   Cardiovascular:      Rate and Rhythm: Normal rate and regular rhythm.      Pulses: Normal pulses.      Heart sounds: Normal heart sounds. No murmur heard.  Pulmonary:      Effort: Pulmonary effort is normal.      Breath sounds: Normal breath sounds. No wheezing.   Abdominal:      General: Bowel sounds are normal. There is no distension.      Palpations: Abdomen is soft. There is no mass.      Tenderness: There is no abdominal tenderness.   Musculoskeletal:         General: No tenderness or deformity.      Cervical back: Normal range of motion and neck supple. No muscular tenderness.      Comments: No swelling, warmth, or tenderness to touch of the right lateral foot   Lymphadenopathy:      Head:      Right side of head: No submandibular, tonsillar, preauricular or posterior auricular adenopathy.      Left side of head: No submandibular, tonsillar, preauricular or posterior auricular adenopathy.      Cervical: No cervical adenopathy.   Skin:     General: Skin is warm and dry.      Capillary Refill: Capillary refill takes less than 2 seconds.      Findings: No bruising or rash.   Neurological:      General: No focal deficit present.      Mental Status: She is alert and oriented to person, place, and time.      Gait: Gait normal.      Deep Tendon Reflexes: Reflexes normal.   Psychiatric:         Mood and Affect: Mood normal.         Behavior: Behavior normal.        Assessment & Plan   Healthy female exam. .     Diagnosis Plan   1. Annual physical exam  Preventative maintenance discussed during visit and information provided in AVS.  Declines vaccinations today.      2. Hypothyroidism due to Hashimoto's thyroiditis  Managed by endocrinology, keep scheduled follow-up and continue medication at current dose.      3. Overweight (BMI 25.0-29.9)  Healthy diet and exercise recommendations provided during today's visit.      4. Strain of right foot, initial encounter  Suspect acute strain, not concerning for any bony abnormality hold on imaging for now.  Recommend ibuprofen as needed denies.  Follow-up if not improving.            2. Patient Counseling:  --Nutrition: Stressed importance of moderation in sodium/caffeine intake, saturated fat and cholesterol, caloric balance, sufficient intake of fresh fruits, vegetables, fiber, calcium, iron, and 1 g folate supplementation if of childbearing age.   --Discussed the issue of calcium supplement, and the daily use of baby aspirin if applicable.             --Mammogram recommended every 2 years from age 40-49 and yearly beginning at age 50.  --Exercise: Stressed the importance of regular exercise.   --Substance Abuse: Discussed cessation/primary prevention of tobacco (if applicable), alcohol, or other drug use (if applicable); driving or other dangerous activities under the influence; availability of treatment for abuse.    --Sexuality: Discussed sexually transmitted diseases, partner selection, use of condoms, avoidance of unintended pregnancy  and contraceptive alternatives.   --Injury prevention: Discussed safety belts, safety helmets, smoke detector, smoking near bedding or upholstery.   --Dental health: Discussed importance of regular tooth brushing, flossing, and dental visits every 6 months.  --Immunizations reviewed.  --Discussed benefits of screening colonoscopy (if applicable).  --After hours service discussed with patient    3. Discussed the patient's BMI with her.  The  BMI is above average; BMI management plan is completed  BMI is >= 25 and <30. (Overweight) The following options were offered after discussion;: exercise counseling/recommendations, nutrition counseling/recommendations, and Information on healthy weight added to patient's after visit summary.     4. Return in about 1 year (around 2/2/2025) for Annual.  Na Becker, KVNG  02/02/2024  13:39 EST

## 2024-02-12 ENCOUNTER — OFFICE VISIT (OUTPATIENT)
Dept: ENDOCRINOLOGY | Facility: CLINIC | Age: 29
End: 2024-02-12
Payer: COMMERCIAL

## 2024-02-12 VITALS
SYSTOLIC BLOOD PRESSURE: 118 MMHG | WEIGHT: 170 LBS | DIASTOLIC BLOOD PRESSURE: 77 MMHG | HEIGHT: 66 IN | BODY MASS INDEX: 27.32 KG/M2 | HEART RATE: 100 BPM | OXYGEN SATURATION: 99 %

## 2024-02-12 DIAGNOSIS — E06.3 HYPOTHYROIDISM DUE TO HASHIMOTO'S THYROIDITIS: Primary | ICD-10-CM

## 2024-02-12 DIAGNOSIS — E03.8 HYPOTHYROIDISM DUE TO HASHIMOTO'S THYROIDITIS: ICD-10-CM

## 2024-02-12 DIAGNOSIS — K90.0 CELIAC DISEASE: ICD-10-CM

## 2024-02-12 DIAGNOSIS — E06.3 HYPOTHYROIDISM DUE TO HASHIMOTO'S THYROIDITIS: ICD-10-CM

## 2024-02-12 DIAGNOSIS — E03.8 HYPOTHYROIDISM DUE TO HASHIMOTO'S THYROIDITIS: Primary | ICD-10-CM

## 2024-02-12 LAB
T4 FREE SERPL-MCNC: 1.24 NG/DL (ref 0.93–1.7)
TSH SERPL DL<=0.05 MIU/L-ACNC: 3.37 UIU/ML (ref 0.27–4.2)

## 2024-02-12 PROCEDURE — 84439 ASSAY OF FREE THYROXINE: CPT | Performed by: INTERNAL MEDICINE

## 2024-02-12 PROCEDURE — 84443 ASSAY THYROID STIM HORMONE: CPT | Performed by: INTERNAL MEDICINE

## 2024-02-12 PROCEDURE — 99213 OFFICE O/P EST LOW 20 MIN: CPT | Performed by: INTERNAL MEDICINE

## 2024-02-12 RX ORDER — LEVOTHYROXINE SODIUM 0.12 MG/1
125 TABLET ORAL DAILY
Qty: 30 TABLET | Refills: 5 | Status: SHIPPED | OUTPATIENT
Start: 2024-02-12 | End: 2024-02-12 | Stop reason: SDUPTHER

## 2024-02-12 RX ORDER — LEVOTHYROXINE SODIUM 0.12 MG/1
125 TABLET ORAL DAILY
Qty: 30 TABLET | Refills: 5 | Status: SHIPPED | OUTPATIENT
Start: 2024-02-12 | End: 2024-02-13 | Stop reason: SDUPTHER

## 2024-02-13 RX ORDER — LEVOTHYROXINE SODIUM 0.12 MG/1
125 TABLET ORAL DAILY
Qty: 30 TABLET | Refills: 5 | Status: SHIPPED | OUTPATIENT
Start: 2024-02-13

## 2024-02-20 RX ORDER — TRIAMCINOLONE ACETONIDE 1 MG/G
1 CREAM TOPICAL 2 TIMES DAILY
Qty: 14 G | Refills: 0 | Status: SHIPPED | OUTPATIENT
Start: 2024-02-20 | End: 2024-02-27

## 2024-03-20 DIAGNOSIS — L25.9 CONTACT DERMATITIS, UNSPECIFIED CONTACT DERMATITIS TYPE, UNSPECIFIED TRIGGER: Primary | ICD-10-CM

## 2024-04-17 ENCOUNTER — TELEPHONE (OUTPATIENT)
Dept: ENDOCRINOLOGY | Facility: CLINIC | Age: 29
End: 2024-04-17
Payer: COMMERCIAL

## 2024-04-18 DIAGNOSIS — E06.3 HYPOTHYROIDISM DUE TO HASHIMOTO'S THYROIDITIS: ICD-10-CM

## 2024-04-18 DIAGNOSIS — E03.8 HYPOTHYROIDISM DUE TO HASHIMOTO'S THYROIDITIS: ICD-10-CM

## 2024-04-18 LAB
T4 FREE SERPL-MCNC: 1.71 NG/DL (ref 0.93–1.7)
TSH SERPL DL<=0.005 MIU/L-ACNC: 1.19 UIU/ML (ref 0.27–4.2)

## 2024-04-18 RX ORDER — LEVOTHYROXINE SODIUM 0.12 MG/1
125 TABLET ORAL DAILY
Qty: 90 TABLET | Refills: 2 | Status: SHIPPED | OUTPATIENT
Start: 2024-04-18

## 2024-04-24 ENCOUNTER — OFFICE VISIT (OUTPATIENT)
Dept: OBSTETRICS AND GYNECOLOGY | Facility: CLINIC | Age: 29
End: 2024-04-24
Payer: COMMERCIAL

## 2024-04-24 VITALS
BODY MASS INDEX: 28.99 KG/M2 | SYSTOLIC BLOOD PRESSURE: 118 MMHG | DIASTOLIC BLOOD PRESSURE: 60 MMHG | WEIGHT: 174 LBS | HEIGHT: 65 IN

## 2024-04-24 DIAGNOSIS — Z12.39 ENCOUNTER FOR BREAST CANCER SCREENING OTHER THAN MAMMOGRAM: ICD-10-CM

## 2024-04-24 DIAGNOSIS — Z01.419 ENCOUNTER FOR GYNECOLOGICAL EXAMINATION WITHOUT ABNORMAL FINDING: Primary | ICD-10-CM

## 2024-04-24 PROCEDURE — 99395 PREV VISIT EST AGE 18-39: CPT | Performed by: OBSTETRICS & GYNECOLOGY

## 2024-04-24 PROCEDURE — 99459 PELVIC EXAMINATION: CPT | Performed by: OBSTETRICS & GYNECOLOGY

## 2024-04-25 NOTE — PROGRESS NOTES
Chief Complaint  Gynecologic Exam     History of Present Illness:  Patient is 28 y.o.  who presents to [facility] here for her annual examination.  Patient is without complaints.  She is doing well with her Kyleena IUD.  She does have occasional episodes of spotting.  She denies any changes in her health or medications.  She sees nurse practitioner Na Becker for primary care.    History  Past Medical History:   Diagnosis Date    Abnormal Pap smear of cervix 2018    Anxiety NA    Comes and goes, has become more prevalent in past year.    Celiac disease     Encounter for IUD insertion 2020    Kyleena    History of medical problems     Celiac disease    HPV (human papilloma virus) infection     Hypothyroidism     Hashimotos    Migraine     Ovarian cyst     PMS (premenstrual syndrome)     Urinary tract infection 2021    Currently on medication to treat    Varicella 1996     Current Outpatient Medications on File Prior to Visit   Medication Sig Dispense Refill    ibuprofen (ADVIL,MOTRIN) 200 MG tablet Take 1 tablet by mouth Every 6 (Six) Hours As Needed for Mild Pain.      levonorgestrel (KYLEENA) 19.5 MG intrauterine device IUD Kyleena 17.5 mcg/24 hrs (5yrs) 19.5mg intrauterine device      levothyroxine (SYNTHROID, LEVOTHROID) 125 MCG tablet Take 1 tablet by mouth Daily. 90 tablet 2    Magnesium Glycinate 100 MG capsule Take  by mouth.      vitamin D3 125 MCG (5000 UT) capsule capsule Take 1,000 Units by mouth 2 (Two) Times a Day.       No current facility-administered medications on file prior to visit.     Allergies   Allergen Reactions    Azithromycin Rash    Glutethimides Rash    Penicillins Rash    Sulfa Antibiotics Rash     Past Surgical History:   Procedure Laterality Date    APPENDECTOMY  2010    BUNIONECTOMY Bilateral     WISDOM TOOTH EXTRACTION  2011     Family History   Problem Relation Age of Onset    Thyroid disease Father         Hyperthyroidism     "Thyroid disease Mother         Hypothyroidism    Celiac disease Mother     Miscarriages / Stillbirths Mother     Depression Brother     Thyroid disease Sister         Hypothyroidism    Celiac disease Sister     Asthma Sister     Anxiety disorder Sister     Depression Sister     No Known Problems Son     No Known Problems Daughter     No Known Problems Paternal Grandfather     No Known Problems Paternal Grandmother     No Known Problems Maternal Grandmother     No Known Problems Maternal Grandfather     No Known Problems Maternal Aunt     No Known Problems Maternal Uncle     No Known Problems Paternal Aunt     No Known Problems Paternal Uncle      Social History     Socioeconomic History    Marital status:    Tobacco Use    Smoking status: Never     Passive exposure: Never    Smokeless tobacco: Never   Vaping Use    Vaping status: Never Used   Substance and Sexual Activity    Alcohol use: Yes     Alcohol/week: 2.0 standard drinks of alcohol     Types: 2 Glasses of wine per week     Comment: Occasional, it varies.    Drug use: Never    Sexual activity: Yes     Partners: Male     Birth control/protection: I.U.D.     Comment: Lissy I.U.D       Physical Examination:  Vital Signs: /60   Ht 165.1 cm (65\")   Wt 78.9 kg (174 lb)   BMI 28.96 kg/m²     General Appearance: alert, appears stated age, and cooperative  Breasts: Examined in supine position  Symmetric without masses or skin dimpling  Nipples normal without inversion, lesions or discharge  There are no palpable axillary nodes  Abdomen: no masses, no hepatomegaly, no splenomegaly, soft non-tender, no guarding, and no rebound tenderness  Pelvic: Clinical staff was present for exam; pelvic examination required for evaluation.  External genitalia:  normal appearance of the external genitalia including Bartholin's and Conejos's glands.  :  urethral meatus normal;  Vaginal:  normal pink mucosa without prolapse or lesions.  Cervix:  normal " appearance.  Uterus:  normal size, shape and consistency.  Adnexa:  normal bimanual exam of the adnexa.  Pap smear done and specimen sent using Thin-Prep technique    Data Review:  The following data was reviewed by: Ashly Adames MD on 04/24/2024:     Labs:  LABS SCANNED (04/18/2024)   Imaging:    Medical Records:  None    Assessment and Plan   1. Encounter for breast cancer screening other than mammogram  Adelaida was counseled regarding having clinical breast exams and breast self-awareness.  Women aged 29-39 years of age should have clinical breast exams every 1-3 years and yearly aged 40 and older.  The patient was counseled regarding breast self-awareness focusing on having a sense of what is normal for her breasts so that she can tell if there are changes.  Even small changes should be reported to provider.     2. Encounter for gynecological examination without abnormal finding  Pap was done today.  If she does not receive the results of the Pap within 2 weeks  time, she was instructed to call to find out the results.  I explained to Adelaida that the recommendations for Pap smear interval in a low risk patient  has lengthened to 3 years time.  I encouraged her to be seen yearly for a full physical exam including breast and pelvic exam even during the off years when PAP's will not be performed.   - LIQUID-BASED PAP SMEAR WITH HPV GENOTYPING REGARDLESS OF INTERPRETATION (PAULINE,COR,MAD)    Follow Up/Instructions:  Follow up as noted.  Patient was given instructions and counseling regarding her condition or for health maintenance advice. Please see specific information pulled into the AVS if appropriate.     Note: Speech recognition transcription software may have been used to dictate portions of this document.  An attempt at proofreading has been made though minor errors in transcription may still be present.    This note was electronically signed.  Ashly Adames M.D.

## 2024-04-29 LAB — REF LAB TEST METHOD: NORMAL

## 2024-05-07 ENCOUNTER — OFFICE VISIT (OUTPATIENT)
Dept: INTERNAL MEDICINE | Facility: CLINIC | Age: 29
End: 2024-05-07
Payer: COMMERCIAL

## 2024-05-07 VITALS
HEART RATE: 101 BPM | BODY MASS INDEX: 29.16 KG/M2 | OXYGEN SATURATION: 99 % | WEIGHT: 175 LBS | DIASTOLIC BLOOD PRESSURE: 68 MMHG | TEMPERATURE: 97.7 F | SYSTOLIC BLOOD PRESSURE: 102 MMHG | HEIGHT: 65 IN

## 2024-05-07 DIAGNOSIS — R35.0 URINARY FREQUENCY: Primary | ICD-10-CM

## 2024-05-07 LAB
BILIRUB BLD-MCNC: NEGATIVE MG/DL
CLARITY, POC: CLEAR
COLOR UR: YELLOW
EXPIRATION DATE: NORMAL
GLUCOSE UR STRIP-MCNC: NEGATIVE MG/DL
KETONES UR QL: NEGATIVE
LEUKOCYTE EST, POC: NEGATIVE
Lab: NORMAL
NITRITE UR-MCNC: NEGATIVE MG/ML
PH UR: 6 [PH] (ref 5–8)
PROT UR STRIP-MCNC: NEGATIVE MG/DL
RBC # UR STRIP: NEGATIVE /UL
SP GR UR: 1.01 (ref 1–1.03)
UROBILINOGEN UR QL: NORMAL

## 2024-05-07 PROCEDURE — 81003 URINALYSIS AUTO W/O SCOPE: CPT | Performed by: PHYSICIAN ASSISTANT

## 2024-05-07 PROCEDURE — 99213 OFFICE O/P EST LOW 20 MIN: CPT | Performed by: PHYSICIAN ASSISTANT

## 2024-05-07 RX ORDER — PHENAZOPYRIDINE HYDROCHLORIDE 100 MG/1
100 TABLET, FILM COATED ORAL 3 TIMES DAILY PRN
Qty: 12 TABLET | Refills: 0 | Status: SHIPPED | OUTPATIENT
Start: 2024-05-07

## 2024-05-07 RX ORDER — NITROFURANTOIN 25; 75 MG/1; MG/1
100 CAPSULE ORAL 2 TIMES DAILY
Qty: 10 CAPSULE | Refills: 0 | Status: SHIPPED | OUTPATIENT
Start: 2024-05-07 | End: 2024-05-12

## 2024-05-09 LAB
BACTERIA UR CULT: NORMAL
BACTERIA UR CULT: NORMAL

## 2024-09-18 ENCOUNTER — OFFICE VISIT (OUTPATIENT)
Dept: INTERNAL MEDICINE | Facility: CLINIC | Age: 29
End: 2024-09-18
Payer: COMMERCIAL

## 2024-09-18 VITALS
DIASTOLIC BLOOD PRESSURE: 85 MMHG | TEMPERATURE: 98.7 F | RESPIRATION RATE: 20 BRPM | HEART RATE: 88 BPM | OXYGEN SATURATION: 99 % | BODY MASS INDEX: 30.32 KG/M2 | WEIGHT: 182 LBS | SYSTOLIC BLOOD PRESSURE: 119 MMHG | HEIGHT: 65 IN

## 2024-09-18 DIAGNOSIS — Z71.85 IMMUNIZATION COUNSELING: ICD-10-CM

## 2024-09-18 DIAGNOSIS — M54.2 ACUTE NECK PAIN: ICD-10-CM

## 2024-09-18 DIAGNOSIS — S16.1XXA CERVICAL MYOFASCIAL STRAIN, INITIAL ENCOUNTER: Primary | ICD-10-CM

## 2024-09-18 DIAGNOSIS — K64.9 HEMORRHOIDS, UNSPECIFIED HEMORRHOID TYPE: ICD-10-CM

## 2024-09-18 RX ORDER — NAPROXEN 500 MG/1
500 TABLET ORAL 2 TIMES DAILY WITH MEALS
Qty: 28 TABLET | Refills: 0 | Status: SHIPPED | OUTPATIENT
Start: 2024-09-18 | End: 2024-10-02

## 2024-09-18 RX ORDER — CYCLOBENZAPRINE HCL 5 MG
5 TABLET ORAL NIGHTLY PRN
Qty: 45 TABLET | Refills: 0 | Status: SHIPPED | OUTPATIENT
Start: 2024-09-18

## 2024-10-02 ENCOUNTER — TREATMENT (OUTPATIENT)
Dept: PHYSICAL THERAPY | Facility: CLINIC | Age: 29
End: 2024-10-02
Payer: COMMERCIAL

## 2024-10-02 DIAGNOSIS — S16.1XXA STRAIN OF NECK MUSCLE, INITIAL ENCOUNTER: Primary | ICD-10-CM

## 2024-10-02 NOTE — PROGRESS NOTES
Physical Therapy Initial Evaluation and Plan of Care  [address] [place] [address]      Patient: Adelaida Grey   : 1995  Diagnosis/ICD-10 Code:  Strain of neck muscle, initial encounter [S16.1XXA]  Referring practitioner: KVNG Simon  Date of Initial Visit: 10/2/2024  Today's Date: 10/2/2024  Patient seen for 1 session         Visit Diagnoses:    ICD-10-CM ICD-9-CM   1. Strain of neck muscle, initial encounter  S16.1XXA 847.0         Subjective Questionnaire: NDI:12      Subjective Evaluation    History of Present Illness  Mechanism of injury: Pt reports pain in the top of the R shoulder (neck). Only on the R side. Lays on her L side to sleep and it causes pain. Sometimes uses a pillow under the arm but does not always help. Notes pain with her  on top of her, pressure.Pain can be burning that radiates down the arm. Moving and wiggling the arm makes it slightly better. Uses lidocaine patches and salonpas patches.These help especially when sleeping. Hot shower helps. Enjoys reading and games. Sits for work at a computer. Has at least 1 HA a day slight to bad, HA from the base of the skull sometimes sleep effects HA.Has a 3.5 hour commute which makes her neck stiff.      Patient Occupation: Therapist Quality of life: good    Pain  Current pain ratin  At best pain ratin  At worst pain ratin  Location: R upper trap, levator, cervical paraspinals  Quality: sharp, dull ache and burning  Relieving factors: heat  Aggravating factors: keyboarding, prolonged positioning, repetitive movement and sleeping  Progression: worsening    Social Support  Lives with: spouse    Hand dominance: right    Diagnostic Tests  No diagnostic tests performed    Treatments  Current treatment: medication  Patient Goals  Patient goals for therapy: decreased pain             Objective          Postural Observations  Seated posture: fair      Palpation   Left   Hypertonic in the levator  scapulae, pectoralis major, scalenes, sternocleidomastoid and upper trapezius.   Tenderness of the levator scapulae.   Trigger point to levator scapulae.     Right   Hypertonic in the cervical paraspinals, levator scapulae, pectoralis major, scalenes, sternocleidomastoid and upper trapezius. Tenderness of the cervical paraspinals, levator scapulae, scalenes, sternocleidomastoid and upper trapezius.   Trigger point to levator scapulae.     Active Range of Motion   Cervical/Thoracic Spine   Cervical    Flexion: 45 degrees   Extension: 60 degrees   Left lateral flexion: 35 degrees   Right lateral flexion: 40 degrees   Left rotation: WFL  Right rotation: WFL    Additional Active Range of Motion Details  Pt flexes from the C7 not from the suboccipital region.    Strength/Myotome Testing     Left Shoulder     Planes of Motion   Flexion: 5   Abduction: 5     Right Shoulder     Planes of Motion   Flexion: 5   Abduction: 5     Additional Strength Details  Pt with report of pain during shoulder abd testing secondary to over activation of upper traps and levator.    Tests   Cervical   Negative repeated extension and repeated flexion.     Left   Positive brachial plexus traction.   Negative active compression (Pickrell), cervical distraction and Spurling's sign.     Right   Positive brachial plexus traction.   Negative active compression (Pickrell), cervical distraction and Spurling's sign.     Additional Tests Details  + for pec compression L but not R      Education provided for cervical positioning when reading or other activities where she will be looking down as well as ergonomic setup of work space.      Assessment & Plan       Assessment  Impairments: abnormal muscle tone, abnormal or restricted ROM, activity intolerance and pain with function   Functional limitations: sleeping, uncomfortable because of pain and sitting   Assessment details: Pt is a 28 YO F who presents to the clinic with R neck pain with R UE pain. Pt  with signs and symptoms consistent with R cervical muscle strain with thoracic outlet compression secondary to postural deficits. Pt will benefit from skilled PT for decreasing neck/UE pain, improving pain free cervical ROM and improving posture to aid with return to normal daily activities.   Barriers to therapy: chronicity, job demands  Prognosis: good    Goals  Plan Goals: SHORT TERM GOALS:     4 weeks  1. Pt independent with HEP  2. Pt to demonstrate pain free cervical AROM to allow for improved ability to perform ADL's  3. Pt to report not having any headaches related to neck pain for the past 3 days    LONG TERM GOALS:   8 weeks  1. Pt to demonstrate improved cervical retraction and scapular depression/retraction to aid with decreasing neck pain.  2. Pt to demonstrate ability to lift 3# OH with bilateral arm(s) without increase in pain in the neck   3. Pt to report being able to work full shift or work in the home without increase in pain in the neck       Plan  Therapy options: will be seen for skilled therapy services  Planned modality interventions: cryotherapy, dry needling, thermotherapy (hydrocollator packs) and ultrasound  Planned therapy interventions: abdominal trunk stabilization, flexibility, functional ROM exercises, home exercise program, joint mobilization, manual therapy, neuromuscular re-education, postural training, soft tissue mobilization, spinal/joint mobilization, strengthening, stretching and therapeutic activities  Frequency: 1x week  Duration in weeks: 8  Treatment plan discussed with: patient        History # of Personal Factors and/or Comorbidities: LOW (0)  Examination of Body System(s): # of elements: LOW (1-2)  Clinical Presentation: STABLE   Clinical Decision Making: LOW       Timed:         Manual Therapy:         mins  86930;     Therapeutic Exercise:   10      mins  01535;     Neuromuscular Mariella:       mins  53534;    Therapeutic Activity:         mins  60808;     Gait  Training:          mins  19221;     Ultrasound:          mins  45249;    Ionto                                   mins   90556  Self Care                      15      mins   90553  Canalith Repos         mins 46176      Un-Timed:  Electrical Stimulation:         mins  43240 ( );  Dry Needling          mins self-pay  Traction          mins 88631  Low Eval   32      Mins  37722  Mod Eval          Mins  85997  High Eval                            Mins  23609        Timed Treatment:   25   mins   Total Treatment:     57   mins      PT: Alisia Kirkland PT     License Number: [id number]    Electronically signed by Alisia Kirkland PT, 10/02/24, 3:33 PM EDT    Certification Period: 10/2/2024 thru 12/30/2024  I certify that the therapy services are furnished while this patient is under my care.  The services outlined above are required by this patient, and will be reviewed every 90 days.         Physician Signature:__________________________________________________    PHYSICIAN: Na Becker APRN  NPI: [id number]      DATE:     Please sign and return via fax to [email or web address] . Thank you, [facility].

## 2024-10-08 ENCOUNTER — TREATMENT (OUTPATIENT)
Dept: PHYSICAL THERAPY | Facility: CLINIC | Age: 29
End: 2024-10-08
Payer: COMMERCIAL

## 2024-10-08 DIAGNOSIS — S16.1XXA STRAIN OF NECK MUSCLE, INITIAL ENCOUNTER: Primary | ICD-10-CM

## 2024-10-08 NOTE — PROGRESS NOTES
Physical Therapy Daily Treatment Note  [address] [place] [address]      Patient: Adelaida Grey   : 1995  Referring practitioner: KVNG Simon  Date of Initial Visit: Type: THERAPY  Noted: 10/2/2024  Today's Date: 10/12/2024  Patient seen for 2 sessions         Visit Diagnoses:    ICD-10-CM ICD-9-CM   1. Strain of neck muscle, initial encounter  S16.1XXA 847.0       Subjective   Patient reports stretching can be painful some times. No big changes yet. Trying to be consistent with exercises and posture.     Objective   See Exercise, Manual, and Modality Logs for complete treatment.   Observation: continues  with forward head and rounded elevated shoulders    Assessment/Plan  Pt with minimal reports of increased pain during treatment today. Educated pt to be more gentle with her stretching and to consider using heat before stretching to aid with improving tissue extensibility. Continue with current POT progressing pt per tolerance.    Timed:         Manual Therapy: 25        mins  94818;     Therapeutic Exercise:     14    mins  93039;     Neuromuscular Mariella:      mins  68752;    Therapeutic Activity:          mins  59742;     Gait Training:           mins  42102;     Ultrasound:          mins  01257;    Ionto                                   mins   70204  Self Care                            mins   09893  Canalith Repos         mins 45815      Un-Timed:  Electrical Stimulation:        mins  90561 ( );  Dry Needling          mins self-pay  Traction          mins 23843      Timed Treatment:   39   mins   Total Treatment:     49   mins    Alisia Kirkland, PT  [place] License: [id number]

## 2024-10-14 RX ORDER — NAPROXEN 500 MG/1
500 TABLET ORAL 2 TIMES DAILY WITH MEALS
Qty: 28 TABLET | Refills: 0 | Status: SHIPPED | OUTPATIENT
Start: 2024-10-14 | End: 2024-10-28

## 2024-10-15 ENCOUNTER — TREATMENT (OUTPATIENT)
Dept: PHYSICAL THERAPY | Facility: CLINIC | Age: 29
End: 2024-10-15
Payer: COMMERCIAL

## 2024-10-15 DIAGNOSIS — S16.1XXA STRAIN OF NECK MUSCLE, INITIAL ENCOUNTER: Primary | ICD-10-CM

## 2024-10-15 NOTE — PROGRESS NOTES
Physical Therapy Daily Treatment Note  [address] [place] [address]      Patient: Adelaida Grey   : 1995  Referring practitioner: KVNG Simon  Date of Initial Visit: Type: THERAPY  Noted: 10/2/2024  Today's Date: 10/19/2024  Patient seen for 3 sessions         Visit Diagnoses:    ICD-10-CM ICD-9-CM   1. Strain of neck muscle, initial encounter  S16.1XXA 847.0       Subjective   Patient reports that she is feeling some better and not having to take as much medicine. Stretches are feeling better also.       Objective   See Exercise, Manual, and Modality Logs for complete treatment.   Observation: slightly improved cervical retraction    Assessment/Plan  Pt without report of increased pain during treatment today. Educated pt to continue with exercises at home and to continue to be mindful of her posture to continue to aid with improving pain. Demonstrates improved posture and cervical ROM without pain. Declined heat today. Continue with current POT progressing pt per tolerance.     Timed:         Manual Therapy: 24        mins  02821;     Therapeutic Exercise:     20    mins  97920;     Neuromuscular Mariella:      mins  23330;    Therapeutic Activity:          mins  71350;     Gait Training:           mins  95168;     Ultrasound:          mins  55283;    Ionto                                   mins   81935  Self Care                            mins   12719  Canalith Repos         mins 22784      Un-Timed:  Electrical Stimulation:        mins  61886 ( );  Dry Needling          mins self-pay  Traction          mins 10544      Timed Treatment:   44   mins   Total Treatment:     44   mins    Alisia Kirkland, PT  [place] License: [id number]

## 2024-10-30 ENCOUNTER — TREATMENT (OUTPATIENT)
Dept: PHYSICAL THERAPY | Facility: CLINIC | Age: 29
End: 2024-10-30
Payer: COMMERCIAL

## 2024-10-30 DIAGNOSIS — S16.1XXA STRAIN OF NECK MUSCLE, INITIAL ENCOUNTER: Primary | ICD-10-CM

## 2024-10-30 NOTE — PROGRESS NOTES
Physical Therapy Daily Treatment Note  [address] [place] [address]      Patient: Adelaida Grey   : 1995  Referring practitioner: KVNG Simon  Date of Initial Visit: Type: THERAPY  Noted: 10/2/2024  Today's Date: 2024  Patient seen for 4 sessions         Visit Diagnoses:    ICD-10-CM ICD-9-CM   1. Strain of neck muscle, initial encounter  S16.1XXA 847.0       Subjective   Patient reports she was in the car for a long time and that was uncomfortable and then she has been at a wedding so some difficulty doing her stretches as well as increased stress. Notes R UE symptoms are gone.Has been trying to continue to be mindful of her posture. Notes she still gets pain with pressure against her chest.    Objective   See Exercise, Manual, and Modality Logs for complete treatment.     Assessment/Plan  Pt without report of increased pain during treatment today. Demonstrates improving cervical retraction positioning but continues with significantly elevated scapular positioning with over use of levator muscle. Educated pt to continue to work with breathing and stress relief techniques to aid with tension in the cervical muscles. Pt with increased tightness in neck musculature today possibly due to stress of past week. Discussed dry needling as option. Continue with current POT progressing pt per tolerance.     Timed:         Manual Therapy: 30        mins  53979;     Therapeutic Exercise:     23    mins  30686;     Neuromuscular Mariella:      mins  22157;    Therapeutic Activity:          mins  69722;     Gait Training:           mins  43157;     Ultrasound:          mins  26060;    Ionto                                   mins   35947  Self Care                            mins   15650  Canalith Repos         mins 28625      Un-Timed:  Electrical Stimulation:        mins  51109 ( );  Dry Needling          mins self-pay  Traction          mins 17884      Timed Treatment:   53    mins   Total Treatment:     53   mins    Alisia Kirkland, PT  [place] License: [id number]

## 2024-11-06 ENCOUNTER — TREATMENT (OUTPATIENT)
Dept: PHYSICAL THERAPY | Facility: CLINIC | Age: 29
End: 2024-11-06
Payer: COMMERCIAL

## 2024-11-06 DIAGNOSIS — S16.1XXA STRAIN OF NECK MUSCLE, INITIAL ENCOUNTER: Primary | ICD-10-CM

## 2024-11-06 NOTE — PROGRESS NOTES
Physical Therapy Daily Treatment Note  [address] [place] [address]      Patient: Adelaida Grey   : 1995  Referring practitioner: KVNG Simon  Date of Initial Visit: Type: THERAPY  Noted: 10/2/2024  Today's Date: 2024  Patient seen for 5 sessions         Visit Diagnoses:    ICD-10-CM ICD-9-CM   1. Strain of neck muscle, initial encounter  S16.1XXA 847.0       Subjective   Patient reports this has been a really stressful week and she can tell that her neck is really tight. Has been tyring to manage it but is having trouble due to stress. Has been trying to do the exercises but has been really busy.    Objective   See Exercise, Manual, and Modality Logs for complete treatment.   Observation: improved rounding but continues with significant scapular elevation    Assessment/Plan  Pt with improved R>L improvements in tissue quality following treatment today. Reminded pt to use breathing techniques with a focus on thoracic expansion rather than neck muscle use for breathing. Pt noted to have increased rib tightness demonstrating tendency to overuse cervical muscles for breathing. Encouraged pt to continue to be mindful of cervical positioning and scapular elevation and use of stretches at least. Continue with current POT progressing pt per tolerance.    Timed:         Manual Therapy: 27        mins  69149;     Therapeutic Exercise:     15    mins  88633;     Neuromuscular Mariella:      mins  54671;    Therapeutic Activity:          mins  02096;     Gait Training:           mins  24408;     Ultrasound:          mins  76740;    Ionto                                   mins   71373  Self Care                            mins   90513  Canalith Repos         mins 06612      Un-Timed:  Electrical Stimulation:        mins  19297 ( );  Dry Needling          mins self-pay  Traction          mins 34391      Timed Treatment:   42   mins   Total Treatment:     52   mins    Alisia  Isael, PT  [place] License: [id number]

## 2024-11-07 RX ORDER — NAPROXEN 500 MG/1
500 TABLET ORAL 2 TIMES DAILY WITH MEALS
Qty: 28 TABLET | Refills: 0 | Status: SHIPPED | OUTPATIENT
Start: 2024-11-07 | End: 2024-11-21

## 2024-11-09 ENCOUNTER — TELEMEDICINE (OUTPATIENT)
Dept: FAMILY MEDICINE CLINIC | Facility: TELEHEALTH | Age: 29
End: 2024-11-09
Payer: COMMERCIAL

## 2024-11-09 DIAGNOSIS — L73.9 FOLLICULITIS OF LEFT AXILLA: Primary | ICD-10-CM

## 2024-11-09 RX ORDER — GINSENG 100 MG
1 CAPSULE ORAL 2 TIMES DAILY
Qty: 28 G | Refills: 0 | Status: SHIPPED | OUTPATIENT
Start: 2024-11-09

## 2024-11-09 RX ORDER — CEPHALEXIN 500 MG/1
500 CAPSULE ORAL 4 TIMES DAILY
Qty: 28 CAPSULE | Refills: 0 | Status: SHIPPED | OUTPATIENT
Start: 2024-11-09 | End: 2024-11-16

## 2024-11-09 NOTE — PROGRESS NOTES
Subjective   Adelaida Grey is a 29 y.o. female.     History of Present Illness  The patient is a 29-year-old female who presents via virtual visit for evaluation of a rash under her left armpit.    She reports that the rash began earlier this week, accompanied by pain and swelling. Initially, she thought it was due to irritation or razor burn, but the condition has worsened. The skin has become red, peeling, and flaking. She has been applying Aquaphor to the area and has refrained from using any other products. She has also stopped shaving her underarms and has been cleaning the area gently with baby shampoo and warm water. Despite these measures, the condition remains painful, particularly when she moves or adjusts her arm.    She suspects the rash may be a chemical burn, possibly caused by using an older deodorant. She did not experience any itching prior to the onset of the rash but notes that her lymph nodes in the armpits tend to swell when she is ill. She also noticed red lesions before the skin split.     She has experienced minimal itching around the area, which she attributes to healing. She has been careful not to touch the area and has been keeping it clean and moisturized with Aquaphor. She accidentally cleaned the area too aggressively one day, causing it to bleed. She has noticed small white dots where her hair follicles are located.       ALLERGIES  She is allergic to AZITHROMYCIN, PENICILLINS, and SULFA. She does tolerated Cephalosporins.    The following portions of the patient's history were reviewed and updated as appropriate: allergies, current medications, past family history, past medical history, past social history, past surgical history, and problem list.    Review of Systems   Constitutional:  Negative for chills and fever.   Skin:  Positive for color change, rash and wound.       Objective   Physical Exam  Constitutional:       General: She is not in acute distress.     Appearance:  She is well-developed. She is not diaphoretic.   Pulmonary:      Effort: Pulmonary effort is normal.   Skin:     Comments: Inflamed hair follicles and  ulcerated skin with sloughing in left axilla   Neurological:      Mental Status: She is alert and oriented to person, place, and time.   Psychiatric:         Behavior: Behavior normal.       Assessment & Plan   Diagnoses and all orders for this visit:    1. Folliculitis of left axilla (Primary)  -     cephalexin (Keflex) 500 MG capsule; Take 1 capsule by mouth 4 (Four) Times a Day for 7 days.  Dispense: 28 capsule; Refill: 0  -     bacitracin 500 UNIT/GM ointment; Apply 1 Application topically to the appropriate area as directed 2 (Two) Times a Day.  Dispense: 28 g; Refill: 0      Assessment & Plan  1. Folliculitis.  Symptoms suggest a bacterial infection likely due to shaving and subsequent sweating. The presence of bacteria in freshly shaved skin could have led to folliculitis. A prescription for Keflex and bacitracin ointment has been provided. The ointment should be applied to the affected area to aid in healing and combat the bacterial infection. It is recommended to keep the area exposed to air as much as possible, covering it only during activities that may introduce dirt or debris. If there is no noticeable improvement within 3 to 5 days, a follow-up appointment will be necessary. Should the condition worsen at any point, an earlier consultation is advised.             Patient or patient representative verbalized consent for the use of Ambient Listening during the visit with  KVNG Joe for chart documentation. 11/9/2024  13:17 EST    The use of a video visit has been reviewed with the patient and verbal informed consent has been obtained. Myself and Adelaida Grey participated in this visit. The patient is located in [place], [place]. I am located in [place], [place]. [employer] and [employer] were utilized. I spent 22 minutes in the patient's  chart for this visit.

## 2024-11-19 ENCOUNTER — TREATMENT (OUTPATIENT)
Dept: PHYSICAL THERAPY | Facility: CLINIC | Age: 29
End: 2024-11-19
Payer: COMMERCIAL

## 2024-11-19 DIAGNOSIS — S16.1XXA STRAIN OF NECK MUSCLE, INITIAL ENCOUNTER: Primary | ICD-10-CM

## 2024-11-20 RX ORDER — METHOCARBAMOL 500 MG/1
500 TABLET, FILM COATED ORAL 3 TIMES DAILY PRN
Qty: 60 TABLET | Refills: 0 | Status: SHIPPED | OUTPATIENT
Start: 2024-11-20

## 2024-11-20 NOTE — PROGRESS NOTES
Physical Therapy Daily Treatment Note  [address] [place] [address]      Patient: Adelaida Grey   : 1995  Referring practitioner: [facility] Simon  Date of Initial Visit: Type: THERAPY  Noted: 10/2/2024  Today's Date: 2024  Patient seen for 6 sessions         Visit Diagnoses:    ICD-10-CM ICD-9-CM   1. Strain of neck muscle, initial encounter  S16.1XXA 847.0       Subjective   Patient reports she has had a very stressful week and knows her neck is tight. Has tried to do her exercises but it has been tough this week..Notes she even had pain in the neck which she ahd not been having    Objective   See Exercise, Manual, and Modality Logs for complete treatment.   Observation: severe scapular elevation    Assessment/Plan  Pt without report of increased pain but without significant decrease in tension with treatment. Educated pt to work on her diaphragmatic breathing as she was noted to be over using her accessory muscles causing neck muscles to be tighter. Encouraged pt to work with her stretches the best she can. Pt's life circumstances are making progress difficult. Discussed consideration for massage therapy. Have discussed dry needling with pt previously. Continue with current POT progressing pt epr tolerance.    Timed:         Manual Therapy: 30        mins  24999;     Therapeutic Exercise:     8    mins  55393;     Neuromuscular Mariella:      mins  38393;    Therapeutic Activity:          mins  36995;     Gait Training:           mins  76047;     Ultrasound:          mins  69674;    Ionto                                   mins   51162  Self Care                            mins   50145  Canalith Repos         mins 96646      Un-Timed:  Electrical Stimulation:        mins  05762 ( );  Dry Needling          mins self-pay  Traction          mins [id number]      Timed Treatment:   38   mins   Total Treatment:     38   mins    Alisia Kirkland, PT  [place] License: [id number]

## 2024-12-02 NOTE — TELEPHONE ENCOUNTER
Rx Refill Note  Requested Prescriptions     Pending Prescriptions Disp Refills    naproxen (Naprosyn) 500 MG tablet 28 tablet 0     Sig: Take 1 tablet by mouth 2 (Two) Times a Day With Meals for 14 days.      Last office visit with prescribing clinician: 9/18/2024   Last telemedicine visit with prescribing clinician: Visit date not found   Next office visit with prescribing clinician: 12/2/2024                         Would you like a call back once the refill request has been completed: [] Yes [] No    If the office needs to give you a call back, can they leave a voicemail: [] Yes [] No    Eddie Edwards CMA  12/02/24, 10:12 EST

## 2024-12-03 RX ORDER — NAPROXEN 500 MG/1
500 TABLET ORAL 2 TIMES DAILY WITH MEALS
Qty: 28 TABLET | Refills: 0 | Status: SHIPPED | OUTPATIENT
Start: 2024-12-03 | End: 2024-12-17

## 2024-12-03 RX ORDER — NAPROXEN 500 MG/1
500 TABLET ORAL 2 TIMES DAILY WITH MEALS
Qty: 60 TABLET | Refills: 0 | OUTPATIENT
Start: 2024-12-03

## 2025-01-13 RX ORDER — NAPROXEN 500 MG/1
500 TABLET ORAL 2 TIMES DAILY WITH MEALS
Qty: 28 TABLET | Refills: 0 | OUTPATIENT
Start: 2025-01-13 | End: 2025-01-27

## 2025-01-24 RX ORDER — METHOCARBAMOL 500 MG/1
500 TABLET, FILM COATED ORAL 3 TIMES DAILY PRN
Qty: 60 TABLET | Refills: 2 | Status: SHIPPED | OUTPATIENT
Start: 2025-01-24

## 2025-02-04 ENCOUNTER — OFFICE VISIT (OUTPATIENT)
Dept: INTERNAL MEDICINE | Facility: CLINIC | Age: 30
End: 2025-02-04
Payer: COMMERCIAL

## 2025-02-04 VITALS
DIASTOLIC BLOOD PRESSURE: 80 MMHG | HEART RATE: 92 BPM | WEIGHT: 191 LBS | HEIGHT: 63 IN | TEMPERATURE: 98.1 F | OXYGEN SATURATION: 98 % | RESPIRATION RATE: 16 BRPM | SYSTOLIC BLOOD PRESSURE: 128 MMHG | BODY MASS INDEX: 33.84 KG/M2

## 2025-02-04 DIAGNOSIS — E66.811 CLASS 1 OBESITY DUE TO EXCESS CALORIES WITHOUT SERIOUS COMORBIDITY WITH BODY MASS INDEX (BMI) OF 33.0 TO 33.9 IN ADULT: ICD-10-CM

## 2025-02-04 DIAGNOSIS — E66.09 CLASS 1 OBESITY DUE TO EXCESS CALORIES WITHOUT SERIOUS COMORBIDITY WITH BODY MASS INDEX (BMI) OF 33.0 TO 33.9 IN ADULT: ICD-10-CM

## 2025-02-04 DIAGNOSIS — F41.1 GENERALIZED ANXIETY DISORDER: ICD-10-CM

## 2025-02-04 DIAGNOSIS — Z00.00 ANNUAL PHYSICAL EXAM: Primary | ICD-10-CM

## 2025-02-04 DIAGNOSIS — F43.21 GRIEF: ICD-10-CM

## 2025-02-04 DIAGNOSIS — E06.3 HYPOTHYROIDISM DUE TO HASHIMOTO'S THYROIDITIS: ICD-10-CM

## 2025-02-04 PROBLEM — Z30.430 ENCOUNTER FOR IUD INSERTION: Status: RESOLVED | Noted: 2020-07-09 | Resolved: 2025-02-04

## 2025-02-04 PROCEDURE — 99395 PREV VISIT EST AGE 18-39: CPT | Performed by: NURSE PRACTITIONER

## 2025-02-04 PROCEDURE — 99214 OFFICE O/P EST MOD 30 MIN: CPT | Performed by: NURSE PRACTITIONER

## 2025-02-04 RX ORDER — BUSPIRONE HYDROCHLORIDE 5 MG/1
5 TABLET ORAL 3 TIMES DAILY
Qty: 90 TABLET | Refills: 1 | Status: SHIPPED | OUTPATIENT
Start: 2025-02-04

## 2025-02-04 NOTE — PROGRESS NOTES
Subjective   Adelaida Grey is a 29 y.o. female and is here for a comprehensive physical exam. The patient reports problems - anxiety worsening .    HPI:  Has a history of anxiety but no true diagnosis. Was able to manage symptoms and triggers on her own. Recently, she had a loved one die by suicide and has worsened her anxiety. Has had difficulty falling asleep due to circumstances revolving around the death of the loved one. Has been doing deep breathing exercises and calming techniques throughout the day to help her. Endorses she has been more dependent on  recently and has a hard time when he is working. Has been having difficulty enjoying things she used to and has been wanting to stay home more. Sees a therapist regularly. Denies suicidal/homicidal ideation.   PHQ-9 Depression Screening  Little interest or pleasure in doing things? Several days   Feeling down, depressed, or hopeless? Several days   PHQ-2 Total Score 2   Trouble falling or staying asleep, or sleeping too much? Several days   Feeling tired or having little energy? Several days   Poor appetite or overeating? Over half   Feeling bad about yourself - or that you are a failure or have let yourself or your family down? Not at all   Trouble concentrating on things, such as reading the newspaper or watching television? Several days   Moving or speaking so slowly that other people could have noticed? Or the opposite - being so fidgety or restless that you have been moving around a lot more than usual? Not at all   Thoughts that you would be better off dead, or of hurting yourself in some way? Not at all   PHQ-9 Total Score 7   If you checked off any problems, how difficult have these problems made it for you to do your work, take care of things at home, or get along with other people? Somewhat difficult       Stopped PT previously due to stressors in life, starting back in February to restart PT. Still take her muscle relaxer at night to  help her sleep and help with muscle stiffness.     Health Habits:  Eye exam within last 2 years - No   Dental exam every 6 months - No  Exercise habits: No structured exercise  Healthy diet - Balanced diet.     The ASCVD Risk score (Deanna GARZA, et al., 2019) failed to calculate for the following reasons:    The 2019 ASCVD risk score is only valid for ages 40 to 79      Do you take any herbs or supplements that were not prescribed by a doctor? no  Are you taking calcium supplements? No  Are you taking aspirin daily? No     History:  LMP: No LMP recorded. Patient has had an implant.  Menopause: No  Last pap date: 2024  Abnormal pap? no  Family history of breast or ovarian cancer: no    OB History    Para Term  AB Living   0 0 0 0 0 0   SAB IAB Ectopic Molar Multiple Live Births   0 0 0 0 0 0      reports being sexually active and has had partner(s) who are male. She reports using the following method of birth control/protection: I.U.D..        The following portions of the patient's history were reviewed and updated as appropriate: She  has a past medical history of Abnormal Pap smear of cervix (2018), Anxiety (NA), Celiac disease, Encounter for IUD insertion (2020), History of medical problems (), HPV (human papilloma virus) infection, Hypothyroidism (), Hypothyroidism due to Hashimoto's thyroiditis (2025), Migraine, Ovarian cyst, PMS (premenstrual syndrome), Urinary tract infection (2021), and Varicella (1996).  She does not have any pertinent problems on file.  She  has a past surgical history that includes Bunionectomy (Bilateral); Appendectomy (); and [place] tooth extraction ().  Her family history includes Anxiety disorder in her sister; Asthma in her sister; Celiac disease in her mother and sister; Depression in her brother and sister; Hyperlipidemia in her father; Mental illness in her sister; Miscarriages / Stillbirths in her mother; No Known Problems in her  daughter, maternal aunt, maternal grandfather, maternal grandmother, maternal uncle, paternal aunt, paternal grandfather, paternal grandmother, paternal uncle, and son; Thyroid disease in her father, mother, and sister.  She  reports that she has never smoked. She has never been exposed to tobacco smoke. She has never used smokeless tobacco. She reports current alcohol use of about 4.0 standard drinks of alcohol per week. She reports that she does not use drugs.  Current Outpatient Medications   Medication Sig Dispense Refill    ibuprofen (ADVIL,MOTRIN) 200 MG tablet Take 1 tablet by mouth Every 6 (Six) Hours As Needed for Mild Pain.      L-THEANINE PO Take 150 mg by mouth.      levonorgestrel (KYLEENA) 19.5 MG intrauterine device IUD Kyleena 17.5 mcg/24 hrs (5yrs) 19.5mg intrauterine device      levothyroxine (SYNTHROID, LEVOTHROID) 125 MCG tablet Take 1 tablet by mouth Daily. 90 tablet 2    Magnesium Glycinate 100 MG capsule Take  by mouth.      methocarbamol (ROBAXIN) 500 MG tablet Take 1 tablet by mouth 3 (Three) Times a Day As Needed for Muscle Spasms. 60 tablet 2    vitamin D3 125 MCG (5000 UT) capsule capsule Take 1,000 Units by mouth 2 (Two) Times a Day.      busPIRone (BUSPAR) 5 MG tablet Take 1 tablet by mouth 3 (Three) Times a Day. 90 tablet 1     No current facility-administered medications for this visit.       Review of Systems  Review of Systems   Constitutional:  Negative for activity change, appetite change, fatigue, fever, unexpected weight gain and unexpected weight loss.   HENT:  Negative for tinnitus and trouble swallowing.    Respiratory:  Negative for cough, chest tightness, shortness of breath and wheezing.    Cardiovascular:  Negative for chest pain and leg swelling.   Gastrointestinal:  Negative for abdominal pain, constipation, diarrhea, nausea, vomiting and GERD.   Genitourinary:  Negative for menstrual problem.   Skin:  Negative for dry skin, rash and bruise.   Allergic/Immunologic:  "Negative for environmental allergies.   Neurological:  Negative for dizziness, weakness, headache and confusion.   Hematological:  Negative for adenopathy.   Psychiatric/Behavioral:  Positive for sleep disturbance. Negative for suicidal ideas and depressed mood. The patient is nervous/anxious.          Objective   /80   Pulse 92   Temp 98.1 °F (36.7 °C)   Resp 16   Ht 160 cm (63\")   Wt 86.6 kg (191 lb)   SpO2 98%   BMI 33.83 kg/m²     Physical Exam  Vitals and nursing note reviewed.   Constitutional:       General: She is not in acute distress.     Appearance: She is obese. She is not ill-appearing.   HENT:      Right Ear: Tympanic membrane, ear canal and external ear normal.      Left Ear: Tympanic membrane, ear canal and external ear normal.      Nose: Nose normal.      Mouth/Throat:      Mouth: Mucous membranes are moist.      Pharynx: Oropharynx is clear.   Eyes:      General: No scleral icterus.     Extraocular Movements: Extraocular movements intact.      Conjunctiva/sclera: Conjunctivae normal.      Pupils: Pupils are equal, round, and reactive to light.      Funduscopic exam:     Right eye: Red reflex present.         Left eye: Red reflex present.  Neck:      Thyroid: No thyromegaly.      Vascular: No carotid bruit.   Cardiovascular:      Rate and Rhythm: Normal rate and regular rhythm.      Chest Wall: PMI is not displaced.      Pulses: Normal pulses.      Heart sounds: Normal heart sounds. No murmur heard.  Abdominal:      Tenderness: There is no abdominal tenderness. There is no guarding or rebound.   Musculoskeletal:         General: Normal range of motion.      Cervical back: Normal range of motion.      Right lower leg: No edema.      Left lower leg: No edema.   Lymphadenopathy:      Cervical: No cervical adenopathy.   Skin:     General: Skin is warm and dry.      Coloration: Skin is not jaundiced.      Findings: No rash.   Neurological:      General: No focal deficit present.      Mental " Status: She is alert and oriented to person, place, and time.      Motor: Motor function is intact.   Psychiatric:         Mood and Affect: Mood is anxious.         Speech: Speech normal.         Behavior: Behavior normal. Behavior is cooperative.         Thought Content: Thought content normal.            Assessment & Plan   Healthy female exam.    Diagnosis Plan   1. Annual physical exam  Preventative measures discussed. Encouraged healthy eating and sun exposure. Encouraged going to dentist and getting an eye exam. All other screenings up to date at this time.       2. Generalized anxiety disorder  busPIRone (BUSPAR) 5 MG tablet    CBC No Differential    Comprehensive metabolic panel    Lipid panel    TSH    T4, free    Vitamin B12      3. Grief  CBC No Differential    Comprehensive metabolic panel    Lipid panel    TSH    T4, free    Vitamin B12    Uncontrolled. Will start Buspirone 5 mg at this time. Side effects explained. Will follow up in 4 weeks for medication compliance and tolerance. Continue doing deep breathing exercises and activities that are enjoyed. If start to experience suicidal/homicidal ideation, call 911 or go to emergency department.       4. Hypothyroidism due to Hashimoto's thyroiditis  CBC No Differential    Comprehensive metabolic panel    Lipid panel    TSH    T4, free    Vitamin B12    Will get labs at this time to check thyroid levels. Continue taking medication as prescribed.       5. Class 1 obesity due to excess calories without serious comorbidity with body mass index (BMI) of 33.0 to 33.9 in adult  CBC No Differential    Comprehensive metabolic panel    Lipid panel    TSH    T4, free    Vitamin B12    Encouraged increase in moderate intensity exercise 5x/week. Increase fruits and vegetables in diet and increase water intake.             2. Patient Counseling:  --Nutrition: Recommend high protein low carbohydrate diet, portion control, moderate sodium and caffeine intake, and 1 g  folic acid supplementation if childbearing age.  --Discussed the issue of calcium supplement, and the daily use of baby aspirin if applicable.               --Screening mammogram to start at age 40 and repeat every 1-2 years. Monthly breast exams by patient recommended.   --Exercise: discussed impact of regular exercise on healthy with goal of 30 minutes of moderate-intensity exercise 4-5 times/week.  --Substance Abuse: Discussed cessation/primary prevention of tobacco (if applicable), alcohol, or other drug use (if applicable); driving or other dangerous activities under the influence; availability of treatment for abuse.   --Sexuality: Discussed sexually transmitted diseases, partner selection, use of condoms, avoidance of unintended pregnancy  and contraceptive alternatives.   --Injury prevention: Discussed safety belts, safety helmets, smoke detector, smoking near bedding or upholstery, avoid distracted driving (texting/phone use).  --Dental health: Discussed importance of regular tooth brushing, flossing, and dental visits every 6 months.  --Immunizations reviewed and recommend staying up-to-date.   --Discussed benefits of screening colonoscopy vs cologuard testing for low risk individuals (if applicable).  --After hours service discussed with patient    3. Discussed the patient's BMI with her.  The BMI is above average; BMI management plan is completed  BMI is >= 30 and <35. (Class 1 Obesity). The following options were offered after discussion;: exercise counseling/recommendations and nutrition counseling/recommendations     4. Return in about 4 weeks (around 3/4/2025) for Next scheduled follow up.  This note accurately reflects work and decisions made by me.     Alexandra Epley, APRN Student/[facility]  02/04/2025  15:13 EST

## 2025-02-12 ENCOUNTER — PATIENT MESSAGE (OUTPATIENT)
Dept: INTERNAL MEDICINE | Facility: CLINIC | Age: 30
End: 2025-02-12
Payer: COMMERCIAL

## 2025-02-12 ENCOUNTER — OFFICE VISIT (OUTPATIENT)
Dept: ENDOCRINOLOGY | Facility: CLINIC | Age: 30
End: 2025-02-12
Payer: COMMERCIAL

## 2025-02-12 VITALS
WEIGHT: 190 LBS | HEIGHT: 65 IN | DIASTOLIC BLOOD PRESSURE: 70 MMHG | OXYGEN SATURATION: 98 % | HEART RATE: 74 BPM | SYSTOLIC BLOOD PRESSURE: 120 MMHG | BODY MASS INDEX: 31.65 KG/M2

## 2025-02-12 DIAGNOSIS — E06.3 HYPOTHYROIDISM DUE TO HASHIMOTO'S THYROIDITIS: Primary | ICD-10-CM

## 2025-02-12 PROCEDURE — 36415 COLL VENOUS BLD VENIPUNCTURE: CPT | Performed by: INTERNAL MEDICINE

## 2025-02-12 PROCEDURE — 99213 OFFICE O/P EST LOW 20 MIN: CPT | Performed by: INTERNAL MEDICINE

## 2025-02-12 RX ORDER — UBIDECARENONE 75 MG
50 CAPSULE ORAL DAILY
COMMUNITY

## 2025-02-12 NOTE — PROGRESS NOTES
"Chief Complaint   Patient presents with    Hashimoto's Thyroiditis        HPI   Adelaida Grey is a 29 y.o. female had concerns including Hashimoto's Thyroiditis.      Has been under more stress. Lost a close friend and two uncles.   Weight has trended up. 15 lb since 5/2024. Knows due to grief and dietary indiscretion. Hunger has been very high at times.     PCP started buspar for anxiety. Much of what she is experiencing is related to the grieving process.     Is on levothyroxine 125 mcg daily. Taking as directed without missing doses.   Due for labs. PCP has pending orders.     Plans to start exercising again.     The following portions of the patient's history were reviewed and updated as appropriate: allergies, current medications, past family history, past medical history, past social history, past surgical history, and problem list.    Review of Systems   Constitutional: Negative.  Positive for appetite change and unexpected weight gain.   Endocrine: Negative.    Psychiatric/Behavioral:  Positive for depressed mood. The patient is nervous/anxious.         /70   Pulse 74   Ht 165.1 cm (65\")   Wt 86.2 kg (190 lb)   SpO2 98%   BMI 31.62 kg/m²      Physical Exam  Vitals reviewed.   Constitutional:       Appearance: Normal appearance. She is obese.      Comments: Body mass index is 31.62 kg/m².   Cardiovascular:      Rate and Rhythm: Normal rate.   Pulmonary:      Effort: Pulmonary effort is normal.   Neurological:      General: No focal deficit present.      Mental Status: She is alert. Mental status is at baseline.   Psychiatric:         Mood and Affect: Mood normal.         Behavior: Behavior normal.              LABS AND IMAGING   TSH  Lab Results   Component Value Date    TSH 1.190 04/17/2024    TSH 3.370 02/12/2024    TSH 0.436 11/23/2022     T4  Lab Results   Component Value Date    FREET4 1.71 (H) 04/17/2024    FREET4 1.24 02/12/2024    FREET4 1.52 11/23/2022     Lab Results   Component " Value Date    N1WJHRJ 8.3 02/25/2022       Assessment and Plan    Diagnoses and all orders for this visit:    1. Hypothyroidism due to Hashimoto's thyroiditis (Primary)  -     T4, Free  -     TSH  Clinically euthyroid on levothyroxine 125 mcg daily.  Last dose adjustment 2/2024. Taking as directed.  Check TFTs when fasting labs updated. With weight gain and fatigue. Dose may need to be adjusted.  Has IUD planning to have changed/removed 7/2025 as it has been 5 years. May try for children within the next 1-2+ years.  Discussed management of hypothyroidism in pregnancy- likely need for increased dose.  Please notify the office prior to trying to conceive or first positive pregnancy test.       Return in about 1 year (around 2/12/2026) for next scheduled follow up. The patient was instructed to contact the clinic with any interval questions or concerns.    Electronically signed by: Kaylee Kincaid DO   Endocrinologist    Please note that portions of this note were completed with a voice recognition program.

## 2025-02-13 NOTE — TELEPHONE ENCOUNTER
Patient needed to R/S to Saturday due to work. Patient advised to go to ER if pain gets worse or she develops a Fever. Patient states understanding.

## 2025-02-15 ENCOUNTER — OFFICE VISIT (OUTPATIENT)
Dept: INTERNAL MEDICINE | Facility: CLINIC | Age: 30
End: 2025-02-15
Payer: COMMERCIAL

## 2025-02-15 VITALS
HEIGHT: 65 IN | BODY MASS INDEX: 31.82 KG/M2 | HEART RATE: 78 BPM | SYSTOLIC BLOOD PRESSURE: 124 MMHG | TEMPERATURE: 98 F | OXYGEN SATURATION: 99 % | DIASTOLIC BLOOD PRESSURE: 82 MMHG | WEIGHT: 191 LBS

## 2025-02-15 DIAGNOSIS — R10.31 ABDOMINAL PAIN, RLQ: Primary | ICD-10-CM

## 2025-02-15 PROCEDURE — 99213 OFFICE O/P EST LOW 20 MIN: CPT | Performed by: FAMILY MEDICINE

## 2025-02-15 PROCEDURE — 81003 URINALYSIS AUTO W/O SCOPE: CPT | Performed by: FAMILY MEDICINE

## 2025-02-15 NOTE — PROGRESS NOTES
Adelaida Grey is a 29 y.o. female.    Chief Complaint   Patient presents with    Abdominal Pain     Dull throbbing pain, on and off for about a week. Right side abdominal pain, 3-4 inches from belly button.        HPI   History of Present Illness  The patient presents with right lower quadrant abdominal pain.    She reports intermittent, dull, aching pain in the right lower quadrant for several weeks, exacerbated by certain positions and activities like walking and bending. Initially associated with eating, this correlation is no longer observed. No systemic symptoms such as fever, nausea, vomiting, diarrhea, or constipation. Occasional reflux symptoms noted, but not out of the ordinary. No current analgesics or heat application. Concerned about a potential hernia after lifting a generator. History of ovarian cysts since high school, with past imaging revealing an appendiceal elongation wrapped around the ovary, leading to an appendectomy.        The following portions of the patient's history were reviewed and updated as appropriate: allergies, current medications, past family history, past medical history, past social history, past surgical history and problem list.     Allergies   Allergen Reactions    Azithromycin Rash    Glutethimides Rash    Penicillins Rash    Sulfa Antibiotics Rash         Current Outpatient Medications:     busPIRone (BUSPAR) 5 MG tablet, Take 1 tablet by mouth 3 (Three) Times a Day., Disp: 90 tablet, Rfl: 1    ibuprofen (ADVIL,MOTRIN) 200 MG tablet, Take 1 tablet by mouth Every 6 (Six) Hours As Needed for Mild Pain., Disp: , Rfl:     L-THEANINE PO, Take 150 mg by mouth., Disp: , Rfl:     levonorgestrel (KYLEENA) 19.5 MG intrauterine device IUD, Kyleena 17.5 mcg/24 hrs (5yrs) 19.5mg intrauterine device, Disp: , Rfl:     levothyroxine (SYNTHROID, LEVOTHROID) 125 MCG tablet, Take 1 tablet by mouth Daily., Disp: 90 tablet, Rfl: 2    Magnesium Glycinate 100 MG capsule, Take  by mouth.,  "Disp: , Rfl:     methocarbamol (ROBAXIN) 500 MG tablet, Take 1 tablet by mouth 3 (Three) Times a Day As Needed for Muscle Spasms., Disp: 60 tablet, Rfl: 2    vitamin B-12 (CYANOCOBALAMIN) 100 MCG tablet, Take 0.5 tablets by mouth Daily., Disp: , Rfl:     vitamin D3 125 MCG (5000 UT) capsule capsule, Take 1,000 Units by mouth 2 (Two) Times a Day., Disp: , Rfl:     ROS    Review of Systems   Constitutional:  Negative for fever and unexpected weight loss.   Gastrointestinal:  Positive for abdominal pain. Negative for constipation, diarrhea, nausea and vomiting.   Genitourinary:  Negative for dysuria, frequency and hematuria.   Musculoskeletal:  Positive for myalgias. Negative for arthralgias.       Vitals:    02/15/25 0909   BP: 124/82   Pulse: 78   Temp: 98 °F (36.7 °C)   SpO2: 99%   Weight: 86.6 kg (191 lb)   Height: 165.1 cm (65\")   PainSc:   3         Physical Exam     Physical Exam  Constitutional:       General: She is not in acute distress.     Appearance: Normal appearance. She is well-developed.   HENT:      Head: Normocephalic and atraumatic.      Right Ear: External ear normal.      Left Ear: External ear normal.   Eyes:      Extraocular Movements: Extraocular movements intact.      Conjunctiva/sclera: Conjunctivae normal.   Cardiovascular:      Rate and Rhythm: Normal rate and regular rhythm.      Heart sounds: No murmur heard.  Pulmonary:      Effort: Pulmonary effort is normal. No respiratory distress.      Breath sounds: Normal breath sounds. No wheezing.   Abdominal:      General: Bowel sounds are normal. There is no distension.      Palpations: Abdomen is soft.      Tenderness: There is abdominal tenderness in the right lower quadrant. There is guarding. There is no rebound.      Comments: Negative psoas sign   Musculoskeletal:      Right lower leg: No edema.      Left lower leg: No edema.   Skin:     General: Skin is warm and dry.   Neurological:      Mental Status: She is alert and oriented to " person, place, and time.      Cranial Nerves: No cranial nerve deficit.   Psychiatric:         Mood and Affect: Mood normal.         Behavior: Behavior normal.             Diagnoses and all orders for this visit:    1. Abdominal pain, RLQ (Primary)  -     POCT urinalysis dipstick, automated  -     US Pelvis Complete        Assessment & Plan  1. Right lower quadrant pain.  Unremarkable urinalysis reduces likelihood of renal calculus. Psoas dysfunction is not likely.  History of ovarian cysts considered in differential diagnosis. Continue ibuprofen, alternate with Tylenol, and apply heat for pain relief. Avoid heavy lifting. Pelvic ultrasound to rule out ovarian cyst.        No orders of the defined types were placed in this encounter.      No orders of the defined types were placed in this encounter.      Return if symptoms worsen or fail to improve.    Minnie Russo, DO

## 2025-02-17 ENCOUNTER — TREATMENT (OUTPATIENT)
Dept: PHYSICAL THERAPY | Facility: CLINIC | Age: 30
End: 2025-02-17
Payer: COMMERCIAL

## 2025-02-17 DIAGNOSIS — M54.2 PAIN, NECK: Primary | ICD-10-CM

## 2025-02-17 NOTE — PROGRESS NOTES
Physical Therapy Initial Evaluation and Plan of Care  [address] [place] [address]      Patient: Adelaida Grey   : 1995  Diagnosis/ICD-10 Code:  Pain, neck [M54.2]  Referring practitioner: KVNG Simon  Date of Initial Visit: 2025  Today's Date: 2025  Patient seen for 1 session         Visit Diagnoses:    ICD-10-CM ICD-9-CM   1. Pain, neck  M54.2 723.1         Subjective Questionnaire: NDI:      Subjective Evaluation    History of Present Illness  Mechanism of injury: Pt reports she is still dealing with the tightness in the top part of her shoulders/neck. Notes pain is not in the spine itself. Reports numbness and tingling in B hands all the way down the arms to all the fingertips. Notes she had a fall about 3 weeks ago onto her L side. Reports tingling started after that. Notes pressure onto her chest still causes shooting pain down the arm. Reports lifting heavy items  (which she has not been doing other than lifting a generator which strained her low back       Patient Occupation: mental health therapist Quality of life: good    Pain  At best pain ratin  At worst pain ratin (pressure from the top of the chest)  Location: cervical muscles, chest and B UE  Quality: dull ache and sharp (numbness,tingling)  Relieving factors: rest and heat  Aggravating factors: lifting and prolonged positioning  Progression: no change    Social Support  Lives with: spouse    Hand dominance: right    Treatments  Current treatment: medication  Patient Goals  Patient goals for therapy: decreased pain             Objective          Static Posture     Shoulders  Elevated and rounded.    Palpation   Left   Hypertonic in the levator scapulae, pectoralis major, scalenes, sternocleidomastoid and upper trapezius.   Tenderness of the levator scapulae, pectoralis major, scalenes, sternocleidomastoid and upper trapezius.     Right   Hypertonic in the levator scapulae, pectoralis  major, scalenes, sternocleidomastoid and upper trapezius. Tenderness of the levator scapulae, pectoralis major, scalenes, sternocleidomastoid and upper trapezius.     Active Range of Motion   Cervical/Thoracic Spine   Cervical    Flexion: WFL  Extension: WFL  Left lateral flexion: WFL  Right lateral flexion: WFL  Left rotation: WFL  Right rotation: WFL  Left Shoulder   Normal active range of motion    Right Shoulder   Normal active range of motion    Additional Active Range of Motion Details  tightness  No pain just tightness    Strength/Myotome Testing     Left Shoulder     Planes of Motion   Flexion: 5   Abduction: 5   External rotation at 0°: 5   Internal rotation at 0°: 5     Right Shoulder     Planes of Motion   Flexion: 5   Abduction: 5   External rotation at 0°: 5   Internal rotation at 0°: 5     Tests   Cervical     Left   Positive brachial plexus traction.   Negative active compression ([place]) and Spurling's sign.     Right   Positive brachial plexus traction.   Negative active compression ([place]) and Spurling's sign.     Additional Tests Details  Pec compression slight + B          Assessment & Plan       Assessment  Impairments: abnormal muscle tone, activity intolerance and pain with function   Functional limitations: carrying objects (Pressure on her chest)  Assessment details: Pt is a 28 YO F who presents to the clinic with B R>L cervical muscle pain and UE symptoms. Pt with signs and symptoms consistent with cervical muscle strain with peripheral nerve compression from the brachial plexus. Pt will benefit from skilled PT for decreasing cervical muscle pain, decreasing B UE symptoms and improving postural muscle strength to aid with return to normal daily activities.  Barriers to therapy: severity, chronicity  Prognosis: fair    Goals  Plan Goals: SHORT TERM GOALS:     4 weeks  1. Pt independent with HEP  2. Pt to demonstrate improved scapular depression and retraction without cuing to allow for  improved ability to perform ADL's  3. Pt to report not having any headaches related to neck pain for the past 3 days    LONG TERM GOALS:   8 weeks  1. Pt to report no pain with weight into her chest to allow for increased activity tolerance.  2. Pt to demonstrate ability to lift 5# OH with bilateral arm(s) without increase in pain in the neck and without overuse of cervical muscles.  3. Pt to report being able to work full shift or work in the home without increase in pain in the neck  4. Pt report cessation of bilateral UE symptoms demonstrating decrease in nerve compression.       Plan  Therapy options: will be seen for skilled therapy services  Planned modality interventions: dry needling and thermotherapy (hydrocollator packs)  Planned therapy interventions: flexibility, functional ROM exercises, home exercise program, joint mobilization, manual therapy, neuromuscular re-education, postural training, soft tissue mobilization, spinal/joint mobilization, strengthening, stretching and therapeutic activities  Frequency: 1x week  Duration in weeks: 8  Treatment plan discussed with: patient        History # of Personal Factors and/or Comorbidities: LOW (0)  Examination of Body System(s): # of elements: LOW (1-2)  Clinical Presentation: STABLE   Clinical Decision Making: LOW       Timed:         Manual Therapy:  25       mins  45796;     Therapeutic Exercise:   3      mins  91986;     Neuromuscular Mariella:       mins  75601;    Therapeutic Activity:          mins  85289;     Gait Training:          mins  00373;     Ultrasound:          mins  62997;    Ionto                                   mins   21120  Self Care                            mins   96954  Canalith Repos         mins 59090      Un-Timed:  Electrical Stimulation:         mins  71836 (MC );  Dry Needling          mins self-pay  Traction          mins 41184  Low Eval   15      Mins  79784  Mod Eval          Mins  49266  High Eval                             Mins  57297        Timed Treatment:   28   mins   Total Treatment:     43   mins      PT: Alisia Kirkland PT     License Number: [id number]    Electronically signed by Alisia Kirkland PT, 02/17/25, 3:29 PM EST    Certification Period: 2/17/2025 thru 5/17/2025  I certify that the therapy services are furnished while this patient is under my care.  The services outlined above are required by this patient, and will be reviewed every 90 days.         Physician Signature:__________________________________________________    PHYSICIAN: Na Becker APRN  NPI: [id number]      DATE:     Please sign and return via fax to [email or web address] . Thank you, [facility].

## 2025-02-25 ENCOUNTER — TREATMENT (OUTPATIENT)
Dept: PHYSICAL THERAPY | Facility: CLINIC | Age: 30
End: 2025-02-25
Payer: COMMERCIAL

## 2025-02-25 DIAGNOSIS — M54.2 PAIN, NECK: Primary | ICD-10-CM

## 2025-02-25 PROCEDURE — 97140 MANUAL THERAPY 1/> REGIONS: CPT

## 2025-02-25 PROCEDURE — 97110 THERAPEUTIC EXERCISES: CPT

## 2025-03-01 NOTE — PROGRESS NOTES
Physical Therapy Daily Treatment Note  [address] [place] [address]      Patient: Adelaida Grey   : 1995  Referring practitioner: KVNG Simon  Date of Initial Visit: Type: THERAPY  Noted: 2025  Today's Date: 2025  Patient seen for 2 sessions         Visit Diagnoses:    ICD-10-CM ICD-9-CM   1. Pain, neck  M54.2 723.1       Subjective   Patient reports that it has been a stressful week and her neck feels really tight. Went on a trip and had some pain while she was in the car. Possibly from looking down.    Objective   See Exercise, Manual, and Modality Logs for complete treatment.       Assessment/Plan  Pt without report of increased pain during treatment. Discussed diaphragmatic/back breathing again due to pt being found to have significant tightness through the lower ribs. Continues with cervical muscle tightness. Continue with current POT progressing pt per tolerance.    Timed:         Manual Therapy: 24        mins  87379;     Therapeutic Exercise:     17    mins  55564;     Neuromuscular Mariella:      mins  57551;    Therapeutic Activity:          mins  60518;     Gait Training:           mins  70828;     Ultrasound:          mins  86005;    Ionto                                   mins   34703  Self Care                            mins   23245  Canalith Repos         mins 45188      Un-Timed:  Electrical Stimulation:        mins  72591 ( );  Dry Needling          mins self-pay  Traction          mins 60000      Timed Treatment:   41   mins   Total Treatment:     41   mins    Alisia Kirkland, PT  [place] License: [id number]

## 2025-03-04 ENCOUNTER — TREATMENT (OUTPATIENT)
Dept: PHYSICAL THERAPY | Facility: CLINIC | Age: 30
End: 2025-03-04
Payer: COMMERCIAL

## 2025-03-04 DIAGNOSIS — M54.2 PAIN, NECK: Primary | ICD-10-CM

## 2025-03-04 PROCEDURE — 97140 MANUAL THERAPY 1/> REGIONS: CPT

## 2025-03-04 PROCEDURE — 97110 THERAPEUTIC EXERCISES: CPT

## 2025-03-04 NOTE — PROGRESS NOTES
Physical Therapy Daily Treatment Note  [address] [place] [address]      Patient: Adelaida Grey   : 1995  Referring practitioner: KVNG Simon  Date of Initial Visit: Type: THERAPY  Noted: 2025  Today's Date: 3/4/2025  Patient seen for 3 sessions         Visit Diagnoses:    ICD-10-CM ICD-9-CM   1. Pain, neck  M54.2 723.1       Subjective   Patient reports it has been a bad week and today in particular was really stressful. Was glad she had PT scheduled. Working on exercises as she can. Has been working on belly breathing and she notices how tight her lower ribs are. Still using heat as she can.     Objective   See Exercise, Manual, and Modality Logs for complete treatment.       Assessment/Plan  Pt continues with severe tightness in the levator and upper trap. Noted to have cervical muscle compensation with increased row resistance. Educated pt that when she has higher demand activities she has a tendency to rely on he neck muscles rather than her shoulder muscles for her strength. Continue with current POT progressing pt per tolerance.    Timed:         Manual Therapy: 26        mins  65010;     Therapeutic Exercise:     14    mins  26946;     Neuromuscular Mariella:      mins  73943;    Therapeutic Activity:          mins  23373;     Gait Training:           mins  47887;     Ultrasound:          mins  93209;    Ionto                                   mins   03810  Self Care                            mins   39046  Canalith Repos         mins 73563      Un-Timed:  Electrical Stimulation:        mins  58309 ( );  Dry Needling          mins self-pay  Traction          mins 60601      Timed Treatment:   40   mins   Total Treatment:     40   mins    Alisia Kirkland PT  [place] License: [id number]

## 2025-03-05 ENCOUNTER — OFFICE VISIT (OUTPATIENT)
Dept: INTERNAL MEDICINE | Facility: CLINIC | Age: 30
End: 2025-03-05
Payer: COMMERCIAL

## 2025-03-05 VITALS
WEIGHT: 193 LBS | RESPIRATION RATE: 16 BRPM | BODY MASS INDEX: 34.2 KG/M2 | SYSTOLIC BLOOD PRESSURE: 124 MMHG | TEMPERATURE: 97.7 F | OXYGEN SATURATION: 97 % | DIASTOLIC BLOOD PRESSURE: 83 MMHG | HEART RATE: 97 BPM | HEIGHT: 63 IN

## 2025-03-05 DIAGNOSIS — F41.1 GENERALIZED ANXIETY DISORDER: Primary | ICD-10-CM

## 2025-03-05 PROCEDURE — 99213 OFFICE O/P EST LOW 20 MIN: CPT | Performed by: NURSE PRACTITIONER

## 2025-03-05 RX ORDER — BUSPIRONE HYDROCHLORIDE 5 MG/1
5 TABLET ORAL 3 TIMES DAILY
Qty: 90 TABLET | Refills: 3 | Status: SHIPPED | OUTPATIENT
Start: 2025-03-05

## 2025-03-05 NOTE — PROGRESS NOTES
"  Office Visit      Patient Name: Adelaida Grey  : 1995   MRN: [id number]   Care Team: Patient Care Team:  Na Becker APRN as PCP - General (Family Medicine)  Kaylee Kincaid DO as Consulting Physician (Endocrinology)    Chief Complaint  Anxiety (Follow-up)    Subjective     Subjective      Adelaida Grey presents to [facility] for follow-up of anxiety.   Started buspirone last visit, has been taking twice per day.   She has not noticed any bothersome side effects, maybe mild drowsiness but tolerable.   She does feel like the medication has helped with her anxiety. Denies suicidal/homicidal ideations.   She has not noticed any panic attacks since starting the medication. Was doing therapy, looking for new therapist at this time.   No acute complaints today. Overall happy with medication, wishes to continue at current dosage.    Objective     Objective   Vital Signs:   /83   Pulse 97   Temp 97.7 °F (36.5 °C)   Resp 16   Ht 160 cm (63\")   Wt 87.5 kg (193 lb)   SpO2 97%   BMI 34.19 kg/m²     Physical Exam  Vitals and nursing note reviewed.   Constitutional:       General: She is not in acute distress.     Appearance: Normal appearance. She is not toxic-appearing.   Eyes:      Pupils: Pupils are equal, round, and reactive to light.   Cardiovascular:      Rate and Rhythm: Normal rate and regular rhythm.      Heart sounds: Normal heart sounds. No murmur heard.  Pulmonary:      Effort: Pulmonary effort is normal. No respiratory distress.      Breath sounds: Normal breath sounds. No wheezing.   Neurological:      General: No focal deficit present.      Mental Status: She is alert and oriented to person, place, and time.   Psychiatric:         Mood and Affect: Mood normal. Affect is tearful.         Behavior: Behavior normal.          Assessment / Plan      Assessment & Plan   Problem List Items Addressed This Visit       Generalized anxiety disorder - " Primary    Improving, continue buspirone as prescribed. Encouraged to get scheduled with new counselor. Recommend healthy diet, daily sun exposure, talking with supportive family and friends, stress management, and exercise. Follow-up in 6 months, sooner if needed.         Follow Up   Return in about 6 months (around 9/5/2025) for Next scheduled follow up.  Patient was given instructions and counseling regarding her condition or for health maintenance advice. Please see specific information pulled into the AVS if appropriate.     KVNG Arriola  [facility]

## 2025-03-11 ENCOUNTER — TREATMENT (OUTPATIENT)
Dept: PHYSICAL THERAPY | Facility: CLINIC | Age: 30
End: 2025-03-11
Payer: COMMERCIAL

## 2025-03-11 DIAGNOSIS — M54.2 PAIN, NECK: Primary | ICD-10-CM

## 2025-03-11 PROCEDURE — 97140 MANUAL THERAPY 1/> REGIONS: CPT

## 2025-03-11 PROCEDURE — 97110 THERAPEUTIC EXERCISES: CPT

## 2025-03-15 NOTE — PROGRESS NOTES
Physical Therapy Daily Treatment Note  [address] [place] [address]      Patient: Adelaida Grey   : 1995  Referring practitioner: KVNG Simon  Date of Initial Visit: Type: THERAPY  Noted: 2025  Today's Date: 3/11/2025  Patient seen for 4 sessions         Visit Diagnoses:    ICD-10-CM ICD-9-CM   1. Pain, neck  M54.2 723.1       Subjective   Patient reports it has not been a good week, she has been super stressed and is now having an issue with her car. Still trying to be mindful of her posture and doing the exercises and heat as time allows. States she is considering discussing with MD an increase in her anxiety medication.    Objective   See Exercise, Manual, and Modality Logs for complete treatment.   Continues with significant hypertrophy in the cervical muscles    Assessment/Plan  Pt without report of increased pain during treatment today. Discussed options with pt as PT is having difficulty making changes to pt's cervical muscle tightness. Educated pt regarding possibility still of dry needling as well as speaking with primary care provider about possible neuro referral for other muscle tension options. Also discussed with pt the benefits of a regular exercise routine in aiding with tension/anxiety to aid as well. Continue with current POT for a few more sessions. Progress pt per tolerance.    Timed:         Manual Therapy: 24        mins  79317;     Therapeutic Exercise:     15    mins  98430;     Neuromuscular Mariella:      mins  68277;    Therapeutic Activity:          mins  27459;     Gait Training:           mins  03322;     Ultrasound:          mins  01790;    Ionto                                   mins   70686  Self Care                            mins   15792  Canalith Repos         mins 51655      Un-Timed:  Electrical Stimulation:        mins  42829 ( );  Dry Needling          mins self-pay  Traction          mins 68960      Timed Treatment:   39    mins   Total Treatment:     39   mins    Alisia Kirkland, PT  [place] License: [id number]

## 2025-03-25 ENCOUNTER — TREATMENT (OUTPATIENT)
Dept: PHYSICAL THERAPY | Facility: CLINIC | Age: 30
End: 2025-03-25
Payer: COMMERCIAL

## 2025-03-25 DIAGNOSIS — M54.2 PAIN, NECK: Primary | ICD-10-CM

## 2025-03-25 PROCEDURE — 97110 THERAPEUTIC EXERCISES: CPT

## 2025-03-25 PROCEDURE — 97140 MANUAL THERAPY 1/> REGIONS: CPT

## 2025-03-25 NOTE — PROGRESS NOTES
Physical Therapy Daily Treatment Note  [address] [place] [address]      Patient: Adelaida Grey   : 1995  Referring practitioner: KVNG Simon  Date of Initial Visit: Type: THERAPY  Noted: 2025  Today's Date: 3/25/2025  Patient seen for 5 sessions         Visit Diagnoses:    ICD-10-CM ICD-9-CM   1. Pain, neck  M54.2 723.1       Subjective   Patient reports she has been trying to be more consistent with her stretches and exercises. Feels the neck may not be as tight as usual this week. Has started a gym membership.      Objective   See Exercise, Manual, and Modality Logs for complete treatment.     Assessment/Plan  Pt without report of increased pain during treatment but stated she could really feel the new exercises performed. Educated pt regarding performance at home to aid with increased movement.Pt does nto want to needle at this time but her consent was put on file today if she chooses to do it in the future. Continue with current POT except decrease frequency to 1X every 2 weeks to allow pt increased exercise time between sessions.    Timed:         Manual Therapy: 27        mins  83576;     Therapeutic Exercise:     12    mins  36189;     Neuromuscular Mariella:      mins  85769;    Therapeutic Activity:          mins  11576;     Gait Training:           mins  23321;     Ultrasound:          mins  74347;    Ionto                                   mins   43985  Self Care                            mins   58843  Canalith Repos         mins 64331      Un-Timed:  Electrical Stimulation:        mins  35871 ( );  Dry Needling          mins self-pay  Traction          mins 65866      Timed Treatment:   39   mins   Total Treatment:     39   mins    Alisia Kirkland PT  [place] License: [id number]

## 2025-04-22 ENCOUNTER — TREATMENT (OUTPATIENT)
Dept: PHYSICAL THERAPY | Facility: CLINIC | Age: 30
End: 2025-04-22
Payer: COMMERCIAL

## 2025-04-22 DIAGNOSIS — M54.2 PAIN, NECK: Primary | ICD-10-CM

## 2025-04-22 PROCEDURE — 97140 MANUAL THERAPY 1/> REGIONS: CPT

## 2025-04-22 PROCEDURE — 97110 THERAPEUTIC EXERCISES: CPT

## 2025-04-23 NOTE — PROGRESS NOTES
Physical Therapy Daily Treatment Note  [address] [place] [address]      Patient: Adelaida Grey   : 1995  Referring practitioner: KVNG Simon  Date of Initial Visit: Type: THERAPY  Noted: 2025  Today's Date: 2025  Patient seen for 6 sessions         Visit Diagnoses:    ICD-10-CM ICD-9-CM   1. Pain, neck  M54.2 723.1       Subjective   Patient reports she just got to the gym yesterday but it did feel good. Thinks slight improvement. Trying to do her exercises. Requests to not do scraping due to having a break out presently.    Objective   See Exercise, Manual, and Modality Logs for complete treatment.     Assessment/Plan  Pt without report of increased pain during treatment. Continues with significant tightness in the cervical muscles. Pt education regarding finding good supportive undergarments and increased gym/exercise performance. Pt's progress may have plateaued at this point. Continue with current POT progressing pt per tolerance.      Timed:         Manual Therapy: 24        mins  19031;     Therapeutic Exercise:     15    mins  17346;     Neuromuscular Mariella:      mins  13506;    Therapeutic Activity:          mins  39689;     Gait Training:           mins  11614;     Ultrasound:          mins  76382;    Ionto                                   mins   21889  Self Care                            mins   64537  Canalith Repos         mins 69437      Un-Timed:  Electrical Stimulation:        mins  89110 ( );  Dry Needling          mins self-pay  Traction          mins 05932      Timed Treatment:   39   mins   Total Treatment:     39   mins    Alisia Kirkland, PT  [place] License: [id number]

## 2025-05-05 ENCOUNTER — TELEPHONE (OUTPATIENT)
Dept: PHYSICAL THERAPY | Facility: CLINIC | Age: 30
End: 2025-05-05

## 2025-05-05 NOTE — TELEPHONE ENCOUNTER
Caller: Adelaida Grey    Relationship: Self         What was the call regarding: WOKE UP FEELING AWFUL

## 2025-05-12 RX ORDER — METHOCARBAMOL 500 MG/1
500 TABLET, FILM COATED ORAL 3 TIMES DAILY PRN
Qty: 60 TABLET | Refills: 2 | Status: SHIPPED | OUTPATIENT
Start: 2025-05-12

## 2025-05-17 DIAGNOSIS — E06.3 HYPOTHYROIDISM DUE TO HASHIMOTO'S THYROIDITIS: ICD-10-CM

## 2025-05-19 RX ORDER — LEVOTHYROXINE SODIUM 125 UG/1
125 TABLET ORAL DAILY
Qty: 90 TABLET | Refills: 2 | Status: SHIPPED | OUTPATIENT
Start: 2025-05-19

## 2025-05-19 NOTE — TELEPHONE ENCOUNTER
Rx Refill Note  Requested Prescriptions     Pending Prescriptions Disp Refills    levothyroxine (SYNTHROID, LEVOTHROID) 125 MCG tablet 90 tablet 2     Sig: Take 1 tablet by mouth Daily.      Last office visit with prescribing clinician: 2/12/2025     Next office visit with prescribing clinician: 2/16/2025     Raven Bailey MA  05/19/25, 08:24 EDT

## 2025-06-17 DIAGNOSIS — F41.1 GENERALIZED ANXIETY DISORDER: ICD-10-CM

## 2025-06-17 RX ORDER — BUSPIRONE HYDROCHLORIDE 5 MG/1
5 TABLET ORAL 3 TIMES DAILY
Qty: 270 TABLET | Refills: 3 | Status: SHIPPED | OUTPATIENT
Start: 2025-06-17

## 2025-07-08 ENCOUNTER — OFFICE VISIT (OUTPATIENT)
Dept: OBSTETRICS AND GYNECOLOGY | Facility: CLINIC | Age: 30
End: 2025-07-08
Payer: COMMERCIAL

## 2025-07-08 VITALS
DIASTOLIC BLOOD PRESSURE: 60 MMHG | BODY MASS INDEX: 32.25 KG/M2 | HEIGHT: 63 IN | SYSTOLIC BLOOD PRESSURE: 120 MMHG | WEIGHT: 182 LBS

## 2025-07-08 DIAGNOSIS — Z30.432 ENCOUNTER FOR IUD REMOVAL: Primary | ICD-10-CM

## 2025-07-09 NOTE — PROGRESS NOTES
IUD Removal    Date of procedure:  7/9/2025    Risks and benefits discussed? yes  All questions answered? yes  Consents given by The patient  Written consent obtained? yes  Reason for removal: Desires pregnancy    Local anesthesia used:  no    Procedure documentation:    A speculum was placed in order to view the cervix.  A tenaculum did not need to be placed on the anterior cervical lip.  Cervical dilation did not need to be performed in order to access the string.  The IUD string was easily seen.  The string was grasped and the IUD was removed without difficulty.  The IUD did not appear to be adherent to the uterine cavity. It was removed intact.    She tolerated the procedure without any difficulty.     Post procedure instructions: Patient notified to call with heavy bleeding, fever or increasing pain.    Follow up as discussed    This note was electronically signed.  Ashly Adames M.D.

## 2025-07-16 ENCOUNTER — OFFICE VISIT (OUTPATIENT)
Dept: BEHAVIORAL HEALTH | Facility: CLINIC | Age: 30
End: 2025-07-16
Payer: COMMERCIAL

## 2025-07-16 VITALS
BODY MASS INDEX: 32.43 KG/M2 | DIASTOLIC BLOOD PRESSURE: 76 MMHG | WEIGHT: 183 LBS | SYSTOLIC BLOOD PRESSURE: 118 MMHG | OXYGEN SATURATION: 99 % | HEART RATE: 88 BPM | HEIGHT: 63 IN

## 2025-07-16 DIAGNOSIS — Z51.81 ENCOUNTER FOR THERAPEUTIC DRUG MONITORING: ICD-10-CM

## 2025-07-16 DIAGNOSIS — F90.0 ATTENTION DEFICIT HYPERACTIVITY DISORDER, INATTENTIVE TYPE, MODERATE: Primary | ICD-10-CM

## 2025-07-16 PROCEDURE — 90792 PSYCH DIAG EVAL W/MED SRVCS: CPT

## 2025-07-16 RX ORDER — METHYLPHENIDATE HYDROCHLORIDE 18 MG/1
18 TABLET ORAL DAILY
Qty: 30 TABLET | Refills: 0 | Status: SHIPPED | OUTPATIENT
Start: 2025-07-16

## 2025-07-16 NOTE — PROGRESS NOTES
New Patient Office Visit    Patient Name: Adelaida Grey  : 1995   MRN: [id number]   Care Team: Patient Care Team:  Na Becker APRN as PCP - General (Family Medicine)  Kaylee Kincaid DO as Consulting Physician (Endocrinology)  Radha Benitez APRN as Nurse Practitioner (Behavioral Health)         Chief Complaint:    Chief Complaint   Patient presents with    ADHD    Anxiety    Med Management       History of Present Illness: Adelaida Grey is a 30 y.o. female who is here today to establish care. Patient endorses a history of anxiety that she has dealt with for as long as she can remember. She reports feeling that for the most part her anxiety seems to be managed well with Buspar. She reports that her and her therapist were discussing a possible ADHD diagnosis which lead to today's evaluation. She reports that she significantly struggles with executive dysfunction. She will often procrastinate and push off tasks. She states she knows she needs to get things done, however, she has a hard time motivating herself to get started on them and get them finished. At home, she will jump around from task to task before completing anything. At work and at home she struggles with focus and finds herself easily distracted. She denies SI/HI today.     The following portion of the patient's history were reviewed and updated appropriately: allergies, current and past medications, family history, medical history and social history. CHELITA reviewed and appropriate.   Subjective   Review of Systems:    Review of Systems    PSYCHIATRIC HISTORY   Current Psychiatric Medications:  Buspar  Prior Psychiatric Medications:  None  Currently in Counseling or Therapy:  Yes  Prior Psychiatric Outpatient Care:  PCP  Prior Psychiatric Hospitalizations:  None   Previous Suicide Attempts:  None   Previous Self-Harming Behavior:  Teenager: peer pressure; mild cutting   History of Seizures or TBI:  None   Abuse  History:  Verbal: yes  Emotional: yes  Mental: yes  Physical: none   Sexual: none   Rape: none        Family Psychiatric History:  Anxiety, Depression, Borderline Personality and ADHD  Any family history of suicide attempts:  None       Substance Abuse History:  Alcohol: no  Smoking/Cigarettes: no  Vaping: no  Smokeless Tobacco: no  Illicit Substances: no  Prescription Medication Misuse: no    Social History:  Born: [place]   Raised: [place]   Currently resides in [place], [place]   Marriage status:    Children: none   Lives with: The patient's currently household consists of the patient and    Highest Level of Education: Masters Degree   Employment: Therapist   History: None   Legal History, Arrests, or Incarcerations: No current legal charges pending.  Patient's Support Network Includes:  significant other and cousin, 's grandparents   Last Menstrual Period: just got IUD out 7/8/2025        Current Medications:   Current Outpatient Medications   Medication Sig Dispense Refill    busPIRone (BUSPAR) 5 MG tablet Take 1 tablet by mouth 3 (Three) Times a Day. 270 tablet 3    L-THEANINE PO Take 150 mg by mouth.      levothyroxine (SYNTHROID, LEVOTHROID) 125 MCG tablet Take 1 tablet by mouth Daily. 90 tablet 2    Magnesium Glycinate 100 MG capsule Take  by mouth.      methocarbamol (ROBAXIN) 500 MG tablet Take 1 tablet by mouth 3 (Three) Times a Day As Needed for Muscle Spasms. 60 tablet 2    vitamin D3 125 MCG (5000 UT) capsule capsule Take 1,000 Units by mouth 2 (Two) Times a Day.      cefdinir (OMNICEF) 300 MG capsule Take 1 capsule by mouth 2 (Two) Times a Day. 20 capsule 0    levonorgestrel (KYLEENA) 19.5 MG intrauterine device IUD Kyleena 17.5 mcg/24 hrs (5yrs) 19.5mg intrauterine device      methylphenidate (Concerta) 18 MG CR tablet Take 1 tablet by mouth Daily 30 tablet 0     No current facility-administered medications for this visit.       Mental Status Exam:   Hygiene:    "good  Cooperation:  Cooperative  Eye Contact:  Good  Psychomotor Behavior:  Appropriate  Affect:  Appropriate  Mood: depressed and anxious  Speech:  Normal  Thought Process:  Goal directed and Linear  Thought Content:  Normal and Mood congruent  Suicidal:  None  Homicidal:  None  Hallucinations:  None  Delusion:  None  Memory:  Intact  Orientation:  Person, Place, Time, and Situation  Reliability:  good  Insight:  Good  Judgement:  Good  Impulse Control:  Good  Physical/Medical Issues:  Yes see chart      Objective   Vital Signs:   /76   Pulse 88   Ht 160 cm (63\")   Wt 83 kg (183 lb)   SpO2 99%   BMI 32.42 kg/m²       Lab Results:       Lab Results   Component Value Date    WBC 7.67 04/07/2025    HGB 14.0 04/07/2025    HCT 43.1 04/07/2025    MCV 90.2 04/07/2025     04/07/2025        Lab Results   Component Value Date    GLUCOSE 109 (H) 04/07/2025    BUN 10 04/07/2025    CREATININE 0.83 04/07/2025     04/07/2025    K 4.1 04/07/2025     04/07/2025    CALCIUM 9.7 04/07/2025    PROTEINTOT 7.1 04/07/2025    ALBUMIN 4.3 04/07/2025    ALT 23 04/07/2025    AST 19 04/07/2025    ALKPHOS 93 04/07/2025    BILITOT 0.3 04/07/2025    GLOB 2.8 04/07/2025    AGRATIO 1.5 04/07/2025    BCR 12.0 04/07/2025    EGFR 97.4 04/07/2025        Lab Results   Component Value Date    CHLPL 179 04/07/2025    TRIG 83 04/07/2025    HDL 42 04/07/2025     (H) 04/07/2025        Lab Results   Component Value Date    TSH 1.580 04/07/2025    E2GJLQX 8.3 02/25/2022        Assessment / Plan    Diagnoses and all orders for this visit:    1. Attention deficit hyperactivity disorder, inattentive type, moderate (Primary)  -     methylphenidate (Concerta) 18 MG CR tablet; Take 1 tablet by mouth Daily  Dispense: 30 tablet; Refill: 0    2. Encounter for therapeutic drug monitoring  -     Compliance Drug Analysis, Ur - Urine, Clean Catch       Will start Concerta daily.     History and physical exam exhibit continued safe and " appropriate use of controlled substance.    Obtained yearly urine drug screen and controlled substance agreement signed.     As part of patient's treatment plan I am prescribing a controlled substance.  The patient has been made aware of the appropriate use of such medications and potential for dependence and/or overdose.  It has also been made clear that these medications are for use by this patient only, without concomitant use of alcohol or other substances, unless prescribed.    Patient/guardian has completed a prescribing agreement detailing terms of continued prescribing of controlled substances, including monitoring CHELITA reports, urine drug screening, and pill counts if necessary.  Patient is aware that inappropriate use will result in cessation of prescribing such medications.      PHQ-2/PHQ-9: Depression Screening  Little interest or pleasure in doing things: Several days  Feeling down, depressed, or hopeless: Several days  Patient Health Questionnaire-2 Score: 2  Trouble falling or staying asleep, or sleeping too much: Several days  Feeling tired or having little energy: More than half the days  Poor appetite or overeating: More than half the days  Feeling bad about yourself - or that you are a failure or have let yourself or your family down: Not at all  Trouble concentrating on things, such as reading the newspaper or watching television: More than half the days  Moving or speaking so slowly that other people could have noticed? Or the opposite - being so fidgety or restless that you have been moving around a lot more than usual.: Not at all  Thoughts that you would be better off dead or hurting yourself in some way: Not at all  Patient Health Questionnaire-9 Score: 9  How difficult have these problems made it for you to do your work, take care of things at home, or get along with other people?: Somewhat difficult      PHQ-9 Score:   PHQ-9 Total Score: 9    Depression Screening:  Patient screened  positive for depression based on a PHQ-9 score of 9 on 7/16/2025. Follow-up recommendations include: Suicide Risk Assessment performed and Continue with medication management.      CAROLYN-7 Score:  Feeling nervous, anxious or on edge: More than half the days  Not being able to stop or control worrying: More than half the days  Worrying too much about different things: More than half the days  Trouble Relaxing: More than half the days  Being so restless that it is hard to sit still: Several days  Feeling afraid as if something awful might happen: Several days  Becoming easily annoyed or irritable: More than half the days  CAROLYN 7 Total Score: 12  If you checked any problems, how difficult have these problems made it for you to do your work, take care of things at home, or get along with other people: Somewhat difficult     ADHD  Screening for Adults With ADHD - (1-6)  1. How often do you have trouble wrapping up the final details of a project, once the challenging parts have been done?: Very Often  2. How often do you have difficulty getting things in order when you have to do a task that requires organization?: Sometimes  3. How often do you have problems remembering appointments or obligations : Sometimes  4. When you have a task that requires a lot of thought, how often do you avoid or delay getting started ?: Very Often  5. How often do you fidget or squirm with your hands or feet when you have to sit down for a long time?: Very Often  6. How often do you feel overly active and compelled to do things, like you were driven by a motor?: Often  7. How often do you make careless mistakes when you have to work on a boring or difficult project?: Sometimes  8. How often do have difficulty keeping your attention when you are doing boring or repetitive work?: Often  9. How often do you have difficulty concentrating on what people say to you, even when they are speaking to you: Often  10.How often do you misplace or have  difficulty finding things at home or at work?: Often  11.How often are you distracted by activity or noise around you?: Often  12.How often do you leave your seat in meetings or other situations in which you are expected to remain seated?: Rarely  13.How often do you feel restless or fidgety?: Often  14.How often do you have difficulty unwinding and relaxing when you have time to yourself?: Often  15.How often do you find yourself talking too much when you are in social situations?: Very Often  16.When you’re in a conversation, how often do you find yourself finishing the sentences of the people you are talking to, before they can finish them themselves?: Very Often  17.How often do you have difficulty waiting your turn in situations when turn taking is required?: Often  18.How often do you interrupt others when they are busy?: Very Often    ADHD SCORING:    -INATTENTIVE SCORE:    -HYPERACTIVE/IMPULSIVE SCORE:    A psychological evaluation was conducted in order to assess past and current level of functioning. Areas assessed included, but were not limited to: perception of social support, perception of ability to face and deal with challenges in life (positive functioning), anxiety symptoms, depressive symptoms, perspective on beliefs/belief system, coping skills for stress, intelligence level,  Therapeutic rapport was established. Interventions conducted today were geared towards incorporating medication management along with support for continued therapy. Education was also provided as to the med management with this provider and what to expect in subsequent sessions.      We discussed risks, benefits, goals and side effects of the above medication and the patient was agreeable with the plan. Patient was educated on the importance of compliance with treatment and follow-up appointments. Patient is aware to contact the [facility] with any worsening of symptoms. To call for questions or concerns and return early  if necessary. Patent is agreeable to go to the Emergency Department or call 911 should they begin SI/HI.    MEDS ORDERED DURING VISIT:  New Medications Ordered This Visit   Medications    methylphenidate (Concerta) 18 MG CR tablet     Sig: Take 1 tablet by mouth Daily     Dispense:  30 tablet     Refill:  0         Follow Up   Return in about 4 weeks (around 8/13/2025).  Patient was given instructions and counseling regarding her condition or for health maintenance advice. Please see specific information pulled into the AVS if appropriate.     TREATMENT PLAN/GOALS: Continue supportive psychotherapy efforts and medications as indicated. Treatment and medication options discussed during today's visit. Patient acknowledged and verbally consented to continue with current treatment plan and was educated on the importance of compliance with treatment and follow-up appointments.    MEDICATION ISSUES:  Discussed medication options and treatment plan of prescribed medication as well as the risks, benefits, and side effects including potential falls, possible impaired driving and metabolic adversities among others. Patient is agreeable to call the office with any worsening of symptoms or onset of side effects. Patient is agreeable to call 911 or go to the nearest ER should he/she begin having SI/HI.        [facility]  [address] [place] [address] [address] ALFARO [id number] [phone]     July 16, 2025 14:20 EDT

## 2025-07-26 LAB — DRUGS UR: NORMAL

## 2025-08-01 ENCOUNTER — HOSPITAL ENCOUNTER (OUTPATIENT)
Dept: ULTRASOUND IMAGING | Facility: HOSPITAL | Age: 30
Discharge: HOME | End: 2025-08-01
Admitting: FAMILY MEDICINE
Payer: COMMERCIAL

## 2025-08-01 PROCEDURE — 76856 US EXAM PELVIC COMPLETE: CPT

## 2025-08-11 ENCOUNTER — OFFICE VISIT (OUTPATIENT)
Dept: BEHAVIORAL HEALTH | Facility: CLINIC | Age: 30
End: 2025-08-11
Payer: COMMERCIAL

## 2025-08-11 VITALS — HEART RATE: 86 BPM | SYSTOLIC BLOOD PRESSURE: 116 MMHG | DIASTOLIC BLOOD PRESSURE: 68 MMHG | OXYGEN SATURATION: 98 %

## 2025-08-11 DIAGNOSIS — F90.0 ATTENTION DEFICIT HYPERACTIVITY DISORDER, INATTENTIVE TYPE, MODERATE: Primary | ICD-10-CM

## 2025-08-11 PROCEDURE — 99213 OFFICE O/P EST LOW 20 MIN: CPT

## 2025-08-11 RX ORDER — METHYLPHENIDATE HYDROCHLORIDE 27 MG/1
27 TABLET ORAL DAILY
Qty: 14 TABLET | Refills: 0 | Status: SHIPPED | OUTPATIENT
Start: 2025-08-11